# Patient Record
Sex: FEMALE | Race: BLACK OR AFRICAN AMERICAN | Employment: UNEMPLOYED | URBAN - METROPOLITAN AREA
[De-identification: names, ages, dates, MRNs, and addresses within clinical notes are randomized per-mention and may not be internally consistent; named-entity substitution may affect disease eponyms.]

---

## 2017-02-16 ENCOUNTER — OFFICE VISIT (OUTPATIENT)
Dept: FAMILY MEDICINE CLINIC | Age: 52
End: 2017-02-16

## 2017-02-16 VITALS
DIASTOLIC BLOOD PRESSURE: 72 MMHG | HEIGHT: 69 IN | TEMPERATURE: 97.1 F | RESPIRATION RATE: 17 BRPM | BODY MASS INDEX: 30.07 KG/M2 | HEART RATE: 71 BPM | OXYGEN SATURATION: 98 % | WEIGHT: 203 LBS | SYSTOLIC BLOOD PRESSURE: 126 MMHG

## 2017-02-16 DIAGNOSIS — R51.9 HEADACHE, UNSPECIFIED HEADACHE TYPE: Primary | ICD-10-CM

## 2017-02-16 DIAGNOSIS — J30.1 SEASONAL ALLERGIC RHINITIS DUE TO POLLEN: ICD-10-CM

## 2017-02-16 DIAGNOSIS — J01.00 SUBACUTE MAXILLARY SINUSITIS: ICD-10-CM

## 2017-02-16 RX ORDER — AMOXICILLIN 500 MG/1
500 CAPSULE ORAL 3 TIMES DAILY
Qty: 30 CAP | Refills: 0 | Status: SHIPPED | OUTPATIENT
Start: 2017-02-16 | End: 2017-02-16 | Stop reason: ALTCHOICE

## 2017-02-16 RX ORDER — CETIRIZINE HCL 10 MG
10 TABLET ORAL
Qty: 30 TAB | Refills: 3 | Status: SHIPPED | OUTPATIENT
Start: 2017-02-16 | End: 2017-10-17 | Stop reason: SDUPTHER

## 2017-02-16 RX ORDER — AZITHROMYCIN 250 MG/1
TABLET, FILM COATED ORAL
Qty: 6 TAB | Refills: 0 | Status: SHIPPED | OUTPATIENT
Start: 2017-02-16 | End: 2017-03-01 | Stop reason: SDUPTHER

## 2017-02-16 NOTE — PROGRESS NOTES
Patient presents to clinic for headache. Advance Directive:    1. Do you have an advance directive in place? Patient Reply:     2. If not, would you like material regarding how to put one in place? Patient Reply:      Coordination of Care:    1. Have you been to the ER, urgent care clinic since your last visit? Hospitalized since your last visit? Yes. Patient unfamiliar with facility name. Treated for ear infection and vertigo    2. Have you seen or consulted any other health care providers outside of the [address] [place] [address] since your last visit? Include any pap smears or colon screening. Psychiatry, Dr. Katarzyna Barillas     Depression Screening completed. Learning Assessment completed. Abuse Screening completed. Health Maintenance reviewed and discussed per provider.

## 2017-02-16 NOTE — MR AVS SNAPSHOT
Visit Information Date & Time Provider Department Dept. Phone Encounter #  
 2/16/2017  1:00 PM Julio C Johnston, [address] [phone] [id number] Follow-up Instructions Return in about 1 month (around 3/16/2017). Your Appointments 3/29/2017 10:15 AM  
COMPLETE PHYSICAL with Atiya Hidalgo MD  
[facility]) Appt Note: CPE  
 [address] [facility] [address]  
  
   
 [address] [facility] [address] Upcoming Health Maintenance Date Due Hepatitis C Screening 1965 DTaP/Tdap/Td series (1 - Tdap) 8/7/1985 PAP AKA CERVICAL CYTOLOGY 8/7/1985 FOBT Q 1 YEAR AGE 50-75 8/7/2014 BREAST CANCER SCRN MAMMOGRAM 11/12/2017 Allergies as of 2/16/2017  Review Complete On: 2/16/2017 By: Julio C Johnston MD  
 No Known Allergies Current Immunizations  Never Reviewed No immunizations on file. Not reviewed this visit You Were Diagnosed With   
  
 Codes Comments Headache, unspecified headache type    -  Primary ICD-10-CM: R51 ICD-9-CM: 784.0 Seasonal allergic rhinitis due to pollen     ICD-10-CM: J30.1 ICD-9-CM: 477.0 Subacute maxillary sinusitis     ICD-10-CM: J01.00 ICD-9-CM: 461.0 Vitals BP Pulse Temp Resp Height(growth percentile) Weight(growth percentile) 126/72 (BP 1 Location: Right arm, BP Patient Position: Sitting) 71 97.1 °F (36.2 °C) (Oral) 17 5' 9\" (1.753 m) 203 lb (92.1 kg) SpO2 BMI OB Status Smoking Status 98% 29.98 kg/m2 Menopause Never Smoker Vitals History BMI and BSA Data Body Mass Index Body Surface Area  
 29.98 kg/m 2 2.12 m 2 Preferred Pharmacy Pharmacy Name Phone [facility] [address]  [facility] [phone] Your Updated Medication List  
  
   
This list is accurate as of: 2/16/17  1:15 PM.  Always use your most recent med list.  
  
  
  
  
 azithromycin 250 mg tablet Commonly known as:  Deni Meza Take 2 tablets today, then take 1 tablet daily buPROPion  mg XL tablet Commonly known as:  Brigidsilvana Bustamante Take 300 mg by mouth every morning. cetirizine 10 mg tablet Commonly known as:  ZYRTEC Take 1 Tab by mouth nightly. ibuprofen 600 mg tablet Commonly known as:  MOTRIN Take 1 Tab by mouth every six (6) hours as needed for Pain.  
  
 lithium carbonate 300 mg capsule Take  by mouth three (3) times daily. metaxalone 400 mg tablet Commonly known as:  SKELAXIN Take 2 Tabs by mouth three (3) times daily. risperiDONE 1 mg tablet Commonly known as:  RisperDAL Take  by mouth daily. Prescriptions Sent to Pharmacy Refills  
 cetirizine (ZYRTEC) 10 mg tablet 3 Sig: Take 1 Tab by mouth nightly. Class: Normal  
 Pharmacy: [address] Ph #: [phone] Route: Oral  
 azithromycin (ZITHROMAX) 250 mg tablet 0 Sig: Take 2 tablets today, then take 1 tablet daily Class: Normal  
 Pharmacy: [address] Ph #: [phone] Follow-up Instructions Return in about 1 month (around 3/16/2017). To-Do List   
 02/16/2017 Imaging:  CT HEAD W WO CONT Referral Information Referral ID Referred By Referred To  
  
 [id number] Barbara Kaylyn SALAMANCA Not Available Visits Status Start Date End Date 1 New Request 2/16/17 2/16/18 If your referral has a status of pending review or denied, additional information will be sent to support the outcome of this decision. Introducing [facility] & HEALTH SERVICES! [facility] introduces Anzu patient portal. Now you can access parts of your medical record, email your doctor's office, and request medication refills online.    
 
1. In your internet browser, go to [email or web address] 2. Click on the First Time User? Click Here link in the Sign In box. You will see the New Member Sign Up page. 3. Enter your I-Stand Access Code exactly as it appears below. You will not need to use this code after youve completed the sign-up process. If you do not sign up before the expiration date, you must request a new code. · I-Stand Access Code: [email or web address] Expires: 5/17/2017  1:15 PM 
 
4. Enter the last four digits of your Social Security Number (xxxx) and Date of Birth (mm/dd/yyyy) as indicated and click Submit. You will be taken to the next sign-up page. 5. Create a Euclid Systemst ID. This will be your I-Stand login ID and cannot be changed, so think of one that is secure and easy to remember. 6. Create a I-Stand password. You can change your password at any time. 7. Enter your Password Reset Question and Answer. This can be used at a later time if you forget your password. 8. Enter your e-mail address. You will receive e-mail notification when new information is available in [address] 9. Click Sign Up. You can now view and download portions of your medical record. 10. Click the Download Summary menu link to download a portable copy of your medical information. If you have questions, please visit the Frequently Asked Questions section of the I-Stand website. Remember, I-Stand is NOT to be used for urgent needs. For medical emergencies, dial 911. Now available from your iPhone and Android! Please provide this summary of care documentation to your next provider. Your primary care clinician is listed as Mariaelena Fay. If you have any questions after today's visit, please call [phone].

## 2017-02-16 NOTE — PROGRESS NOTES
Loree Blankenship is a 45 y.o.  female and presents with     Chief Complaint   Patient presents with    Head Pain    Allergic Rhinitis    Sinus Infection       Pt says she had ear ache and vertigo in October and went to urgent care. Today she has nasal stuffiness, headaches and sinus congestion. Pt thinks she has sinusitis. Pt says headahces are getting worse. Her uncel had brain tumor. She sweats but she went through menopause 10 years back. Past Medical History   Diagnosis Date    Arthritis     Bipolar 1 disorder ([address]      Past Surgical History   Procedure Laterality Date    Hx tubal ligation      Hx other surgical       knee surgery    Hx  section       x2     Current Outpatient Prescriptions   Medication Sig    amoxicillin (AMOXIL) 500 mg capsule Take 1 Cap by mouth three (3) times daily for 10 days.  cetirizine (ZYRTEC) 10 mg tablet Take 1 Tab by mouth nightly.  risperiDONE (RISPERDAL) 1 mg tablet Take  by mouth daily.  buPROPion XL (WELLBUTRIN XL) 300 mg XL tablet Take 300 mg by mouth every morning.  lithium carbonate 300 mg capsule Take  by mouth three (3) times daily.  ibuprofen (MOTRIN) 600 mg tablet Take 1 Tab by mouth every six (6) hours as needed for Pain.  metaxalone (SKELAXIN) 400 mg tablet Take 2 Tabs by mouth three (3) times daily. No current facility-administered medications for this visit. Health Maintenance   Topic Date Due    Hepatitis C Screening  1965    DTaP/Tdap/Td series (1 - Tdap) 1985    PAP AKA CERVICAL CYTOLOGY  1985    FOBT Q 1 YEAR AGE 50-75  2014    BREAST CANCER SCRN MAMMOGRAM  2017    INFLUENZA AGE 9 TO ADULT  Completed       There is no immunization history on file for this patient. No LMP recorded. Patient is not currently having periods (Reason: Menopause).         Allergies and Intolerances:   No Known Allergies    Family History:   Family History   Problem Relation Age of Onset    Bipolar Disorder Mother     Cancer Father     Diabetes Paternal Grandmother        Social History:   She  reports that she has never smoked. She has never used smokeless tobacco.  She  reports that she does not drink alcohol. Review of Systems: pos for headache  General: negative for - chills, fatigue, fever, weight change  Psych: negative for - anxiety, depression, irritability or mood swings  ENT: negative for - headaches, hearing change, nasal congestion, oral lesions, sneezing or sore throat  Heme/ Lymph: negative for - bleeding problems, bruising, pallor or swollen lymph nodes  Endo: negative for - hot flashes, polydipsia/polyuria or temperature intolerance  Resp: negative for - cough, shortness of breath or wheezing  CV: negative for - chest pain, edema or palpitations  GI: negative for - abdominal pain, change in bowel habits, constipation, diarrhea or nausea/vomiting  : negative for - dysuria, hematuria, incontinence, pelvic pain or vulvar/vaginal symptoms  MSK: negative for - joint pain, joint swelling or muscle pain  Neuro: negative for - confusion, headaches, seizures or weakness  Derm: negative for - dry skin, hair changes, rash or skin lesion changes          Physical:   Vitals:   Vitals:    02/16/17 1248   BP: 126/72   Pulse: 71   Resp: 17   Temp: 97.1 °F (36.2 °C)   TempSrc: Oral   SpO2: 98%   Weight: 203 lb (92.1 kg)   Height: 5' 9\" (1.753 m)           Exam:   HEENT- atraumatic,normocephalic, awake, oriented, well nourished,mild tenderness over left maxillary sinus  Neck - supple,no enlarged lymph nodes, no JVD, no thyromegaly  Chest- CTA, no rhonchi, no crackles  Heart- rrr, no murmurs / gallop/rub  Abdomen- soft,BS+,NT, no hepatosplenomegaly  Ext - no c/c/edema   Neuro- no focal deficits. Power 5/5 all extremities  Skin - warm,dry, no obvious rashes.           Review of Data:   LABS:   Lab Results   Component Value Date/Time    WBC 6.8 07/05/2016 09:00 AM    HGB 13.2 07/05/2016 09:00 AM    HCT 41.4 07/05/2016 09:00 AM    PLATELET 900 88/08/9639 09:00 AM     Lab Results   Component Value Date/Time    Sodium 139 07/05/2016 09:00 AM    Potassium 4.2 07/05/2016 09:00 AM    Chloride 105 07/05/2016 09:00 AM    CO2 26 07/05/2016 09:00 AM    Glucose 91 07/05/2016 09:00 AM    BUN 13 07/05/2016 09:00 AM    Creatinine 1.01 07/05/2016 09:00 AM     Lab Results   Component Value Date/Time    Cholesterol, total 172 07/05/2016 09:00 AM    HDL Cholesterol 51 07/05/2016 09:00 AM    LDL, calculated 76 07/05/2016 09:00 AM    Triglyceride 225 07/05/2016 09:00 AM     No results found for: GPT        Impression / Plan:        ICD-10-CM ICD-9-CM    1. Headache, unspecified headache type R51 784.0 CT HEAD W WO CONT   2. Seasonal allergic rhinitis due to pollen J30.1 477.0 cetirizine (ZYRTEC) 10 mg tablet   3. Subacute maxillary sinusitis J01.00 461.0 amoxicillin (AMOXIL) 500 mg capsule         Explained to patient risk benefits of the medications. Advised patient to stop meds if having any side effects. Pt verbalized understanding of the instructions. I have discussed the diagnosis with the patient and the intended plan as seen in the above orders. The patient has received an after-visit summary and questions were answered concerning future plans. I have discussed medication side effects and warnings with the patient as well. I have reviewed the plan of care with the patient, accepted their input and they are in agreement with the treatment goals. Reviewed plan of care. Patient has provided input and agrees with goals.     Follow-up Disposition: Not on Eunice Burgess MD

## 2017-02-24 ENCOUNTER — TELEPHONE (OUTPATIENT)
Dept: FAMILY MEDICINE CLINIC | Age: 52
End: 2017-02-24

## 2017-02-24 NOTE — TELEPHONE ENCOUNTER
Pt calling in to let Dr. Isaak Del Rio know she talked to her  and they don't feel like they need to gt the MRI done. Thinks it was caused by her being in the bathroom cleaning with the window closed. Please note.

## 2017-03-01 ENCOUNTER — OFFICE VISIT (OUTPATIENT)
Dept: FAMILY MEDICINE CLINIC | Age: 52
End: 2017-03-01

## 2017-03-01 VITALS
HEART RATE: 88 BPM | TEMPERATURE: 97.5 F | WEIGHT: 206 LBS | RESPIRATION RATE: 16 BRPM | DIASTOLIC BLOOD PRESSURE: 67 MMHG | BODY MASS INDEX: 30.51 KG/M2 | HEIGHT: 69 IN | SYSTOLIC BLOOD PRESSURE: 105 MMHG | OXYGEN SATURATION: 97 %

## 2017-03-01 DIAGNOSIS — J01.00 SUBACUTE MAXILLARY SINUSITIS: ICD-10-CM

## 2017-03-01 DIAGNOSIS — J30.1 SEASONAL ALLERGIC RHINITIS DUE TO POLLEN: Primary | ICD-10-CM

## 2017-03-01 DIAGNOSIS — R05.9 COUGH: ICD-10-CM

## 2017-03-01 RX ORDER — FLUTICASONE PROPIONATE 50 MCG
2 SPRAY, SUSPENSION (ML) NASAL DAILY
Qty: 1 BOTTLE | Refills: 3 | Status: SHIPPED | OUTPATIENT
Start: 2017-03-01 | End: 2017-10-17

## 2017-03-01 RX ORDER — AZITHROMYCIN 250 MG/1
TABLET, FILM COATED ORAL
Qty: 6 TAB | Refills: 0 | Status: SHIPPED | OUTPATIENT
Start: 2017-03-01 | End: 2017-03-06

## 2017-03-01 NOTE — PROGRESS NOTES
Watt Litten is a 45 y.o.  female and presents with     Chief Complaint   Patient presents with    Cough    Sinus Infection     Pt says she took the [address] and she felt better with her sinuses. But the symptoms have now come back . She says she has it for a long time so she knows when the sinus problem is coming back. She does not want to do CT scans for her headaches. As she knows it is sinuses. Pt wants to try the same abx again and does not want to try amoxicillin. Pt feels zyrtec helps her some with allergies. Past Medical History:   Diagnosis Date    Arthritis     Bipolar 1 disorder ([facility] [address]      Past Surgical History:   Procedure Laterality Date    HX  SECTION      x2    HX OTHER SURGICAL      knee surgery    HX TUBAL LIGATION       Current Outpatient Prescriptions   Medication Sig    azithromycin (ZITHROMAX) 250 mg tablet Take 2 tablets today, then take 1 tablet daily    fluticasone (FLONASE) 50 mcg/actuation nasal spray 2 Sprays by Both Nostrils route daily.  cetirizine (ZYRTEC) 10 mg tablet Take 1 Tab by mouth nightly.  risperiDONE (RISPERDAL) 1 mg tablet Take  by mouth daily.  buPROPion XL (WELLBUTRIN XL) 300 mg XL tablet Take 300 mg by mouth every morning.  lithium carbonate 300 mg capsule Take  by mouth three (3) times daily.  ibuprofen (MOTRIN) 600 mg tablet Take 1 Tab by mouth every six (6) hours as needed for Pain.  metaxalone (SKELAXIN) 400 mg tablet Take 2 Tabs by mouth three (3) times daily. No current facility-administered medications for this visit. Health Maintenance   Topic Date Due    Hepatitis C Screening  1965    DTaP/Tdap/Td series (1 - Tdap) 1985    PAP AKA CERVICAL CYTOLOGY  1985    FOBT Q 1 YEAR AGE 50-75  2014    BREAST CANCER SCRN MAMMOGRAM  2017    INFLUENZA AGE 9 TO ADULT  Completed       There is no immunization history on file for this patient. No LMP recorded.  Patient is not currently having periods (Reason: Menopause). Allergies and Intolerances:   No Known Allergies    Family History:   Family History   Problem Relation Age of Onset    Bipolar Disorder Mother     Cancer Father     Diabetes Paternal Grandmother        Social History:   She  reports that she has never smoked. She has never used smokeless tobacco.  She  reports that she does not drink alcohol. Review of Systems: pos for allergies, pos for headaches  General: negative for - chills, fatigue, fever, weight change  Psych: negative for - anxiety, depression, irritability or mood swings  ENT: negative for - headaches, hearing change, nasal congestion, oral lesions, sneezing or sore throat  Heme/ Lymph: negative for - bleeding problems, bruising, pallor or swollen lymph nodes  Endo: negative for - hot flashes, polydipsia/polyuria or temperature intolerance  Resp: negative for - cough, shortness of breath or wheezing  CV: negative for - chest pain, edema or palpitations  GI: negative for - abdominal pain, change in bowel habits, constipation, diarrhea or nausea/vomiting  : negative for - dysuria, hematuria, incontinence, pelvic pain or vulvar/vaginal symptoms  MSK: negative for - joint pain, joint swelling or muscle pain  Neuro: negative for - confusion,  seizures or weakness  Derm: negative for - dry skin, hair changes, rash or skin lesion changes          Physical:   Vitals:   Vitals:    03/01/17 1240   BP: 105/67   Pulse: 88   Resp: 16   Temp: 97.5 °F (36.4 °C)   TempSrc: Oral   SpO2: 97%   Weight: 206 lb (93.4 kg)   Height: 5' 9\" (1.753 m)           Exam:   HEENT- atraumatic,normocephalic, awake, oriented, well nourished, enlarged inferior turbinates   Neck - supple,no enlarged lymph nodes, no JVD, no thyromegaly  Chest- CTA, no rhonchi, no crackles  Heart- rrr, no murmurs / gallop/rub  Abdomen- soft,BS+,NT, no hepatosplenomegaly  Ext - no c/c/edema   Neuro- no focal deficits. Power 5/5 all extremities  Skin - warm,dry, no obvious rashes. Review of Data:   LABS:   Lab Results   Component Value Date/Time    WBC 6.8 07/05/2016 09:00 AM    HGB 13.2 07/05/2016 09:00 AM    HCT 41.4 07/05/2016 09:00 AM    PLATELET 448 01/20/0791 09:00 AM     Lab Results   Component Value Date/Time    Sodium 139 07/05/2016 09:00 AM    Potassium 4.2 07/05/2016 09:00 AM    Chloride 105 07/05/2016 09:00 AM    CO2 26 07/05/2016 09:00 AM    Glucose 91 07/05/2016 09:00 AM    BUN 13 07/05/2016 09:00 AM    Creatinine 1.01 07/05/2016 09:00 AM     Lab Results   Component Value Date/Time    Cholesterol, total 172 07/05/2016 09:00 AM    HDL Cholesterol 51 07/05/2016 09:00 AM    LDL, calculated 76 07/05/2016 09:00 AM    Triglyceride 225 07/05/2016 09:00 AM     No results found for: GPT        Impression / Plan:        ICD-10-CM ICD-9-CM    1. Seasonal allergic rhinitis due to pollen J30.1 477.0 fluticasone (FLONASE) 50 mcg/actuation nasal spray      REFERRAL TO ALLERGY   2. Subacute maxillary sinusitis J01.00 461.0 azithromycin (ZITHROMAX) 250 mg tablet      fluticasone (FLONASE) 50 mcg/actuation nasal spray   3. Cough R05 786.2          Explained to patient risk benefits of the medications. Advised patient to stop meds if having any side effects. Pt verbalized understanding of the instructions. I have discussed the diagnosis with the patient and the intended plan as seen in the above orders. The patient has received an after-visit summary and questions were answered concerning future plans. I have discussed medication side effects and warnings with the patient as well. I have reviewed the plan of care with the patient, accepted their input and they are in agreement with the treatment goals. Reviewed plan of care. Patient has provided input and agrees with goals.     Follow-up Disposition: Not on Caryl Nino MD

## 2017-03-01 NOTE — MR AVS SNAPSHOT
Visit Information Date & Time Provider Department Dept. Phone Encounter #  
 3/1/2017 12:45 PM Jose L MATIAS Cody LozanoCharanjit 6 [phone] Follow-up Instructions Return in about 1 month (around 4/1/2017). Your Appointments 3/29/2017 10:15 AM  
COMPLETE PHYSICAL with Chuy Alvarez MD  
[facility]) Appt Note: CPE  
 [address] [facility] [address]  
  
   
 [address] [facility] Upcoming Health Maintenance Date Due Hepatitis C Screening 1965 DTaP/Tdap/Td series (1 - Tdap) 8/7/1985 PAP AKA CERVICAL CYTOLOGY 8/7/1985 FOBT Q 1 YEAR AGE 50-75 8/7/2014 BREAST CANCER SCRN MAMMOGRAM 11/12/2017 Allergies as of 3/1/2017  Review Complete On: 3/1/2017 By: Jose L Lozano MD  
 No Known Allergies Current Immunizations  Never Reviewed No immunizations on file. Not reviewed this visit You Were Diagnosed With   
  
 Codes Comments Seasonal allergic rhinitis due to pollen    -  Primary ICD-10-CM: J30.1 ICD-9-CM: 477.0 Subacute maxillary sinusitis     ICD-10-CM: J01.00 ICD-9-CM: 461.0 Cough     ICD-10-CM: R05 ICD-9-CM: 331. 2 Vitals BP  
  
  
  
  
  
 105/67 (BP 1 Location: Left arm, BP Patient Position: Sitting) Vitals History BMI and BSA Data Body Mass Index Body Surface Area  
 30.42 kg/m 2 2.13 m 2 Preferred Pharmacy Pharmacy Name Phone [facility] [address]  [facility] [phone] Your Updated Medication List  
  
   
This list is accurate as of: 3/1/17  1:00 PM.  Always use your most recent med list.  
  
  
  
  
 azithromycin 250 mg tablet Commonly known as:  Ann-Marie Monroe Take 2 tablets today, then take 1 tablet daily buPROPion  mg XL tablet Commonly known as:  Yanick Saunders  
 Take 300 mg by mouth every morning. cetirizine 10 mg tablet Commonly known as:  ZYRTEC Take 1 Tab by mouth nightly. fluticasone 50 mcg/actuation nasal spray Commonly known as:  Suzanne Shames 2 Sprays by Both Nostrils route daily. ibuprofen 600 mg tablet Commonly known as:  MOTRIN Take 1 Tab by mouth every six (6) hours as needed for Pain.  
  
 lithium carbonate 300 mg capsule Take  by mouth three (3) times daily. metaxalone 400 mg tablet Commonly known as:  SKELAXIN Take 2 Tabs by mouth three (3) times daily. risperiDONE 1 mg tablet Commonly known as:  RisperDAL Take  by mouth daily. Prescriptions Sent to Pharmacy Refills  
 azithromycin (ZITHROMAX) 250 mg tablet 0 Sig: Take 2 tablets today, then take 1 tablet daily Class: Normal  
 Pharmacy: [address] [place] [address] Ph #: [phone]  
 fluticasone (FLONASE) 50 mcg/actuation nasal spray 3 Si Sprays by Both Nostrils route daily. Class: Normal  
 Pharmacy: [facility] [address] [facility] Ph #: [phone] Route: Both Nostrils We Performed the Following REFERRAL TO ALLERGY [REF5 Custom] Comments:  
 Please evaluate patient for allergies Follow-up Instructions Return in about 1 month (around 2017). Referral Information Referral ID Referred By Referred To  
  
 [id number] Armando SALAMANCA Not Available Visits Status Start Date End Date 1 New Request 3/1/17 3/1/18 If your referral has a status of pending review or denied, additional information will be sent to support the outcome of this decision. Introducing [facility] & HEALTH SERVICES! Joelle Gil introduces MAZ patient portal. Now you can access parts of your medical record, email your doctor's office, and request medication refills online.    
 
1. In your internet browser, go to [email or web address] 2. Click on the First Time User? Click Here link in the Sign In box. You will see the New Member Sign Up page. 3. Enter your Briggo Access Code exactly as it appears below. You will not need to use this code after youve completed the sign-up process. If you do not sign up before the expiration date, you must request a new code. · Briggo Access Code: [email or web address] Expires: 5/17/2017  1:15 PM 
 
4. Enter the last four digits of your Social Security Number (xxxx) and Date of Birth (mm/dd/yyyy) as indicated and click Submit. You will be taken to the next sign-up page. 5. Create a Lockboxt ID. This will be your Briggo login ID and cannot be changed, so think of one that is secure and easy to remember. 6. Create a Briggo password. You can change your password at any time. 7. Enter your Password Reset Question and Answer. This can be used at a later time if you forget your password. 8. Enter your e-mail address. You will receive e-mail notification when new information is available in [address] 9. Click Sign Up. You can now view and download portions of your medical record. 10. Click the Download Summary menu link to download a portable copy of your medical information. If you have questions, please visit the Frequently Asked Questions section of the Briggo website. Remember, Briggo is NOT to be used for urgent needs. For medical emergencies, dial 911. Now available from your iPhone and Android! Please provide this summary of care documentation to your next provider. Your primary care clinician is listed as Elana Barrett. If you have any questions after today's visit, please call [phone].

## 2017-03-01 NOTE — PROGRESS NOTES
1. Have you been to the ER, urgent care clinic since your last visit? Hospitalized since your last visit? No    2. Have you seen or consulted any other health care providers outside of the [address] [place] [address] since your last visit? Include any pap smears or colon screening.  No

## 2017-03-29 ENCOUNTER — OFFICE VISIT (OUTPATIENT)
Dept: FAMILY MEDICINE CLINIC | Age: 52
End: 2017-03-29

## 2017-03-29 ENCOUNTER — HOSPITAL ENCOUNTER (OUTPATIENT)
Dept: LAB | Age: 52
Discharge: HOME | End: 2017-03-29
Payer: MEDICARE

## 2017-03-29 VITALS
WEIGHT: 201.4 LBS | OXYGEN SATURATION: 97 % | RESPIRATION RATE: 18 BRPM | TEMPERATURE: 96.9 F | DIASTOLIC BLOOD PRESSURE: 74 MMHG | SYSTOLIC BLOOD PRESSURE: 109 MMHG | HEART RATE: 80 BPM | HEIGHT: 69 IN | BODY MASS INDEX: 29.83 KG/M2

## 2017-03-29 DIAGNOSIS — Z13.39 SCREENING FOR ALCOHOLISM: ICD-10-CM

## 2017-03-29 DIAGNOSIS — Z00.00 ROUTINE GENERAL MEDICAL EXAMINATION AT A HEALTH CARE FACILITY: Primary | ICD-10-CM

## 2017-03-29 DIAGNOSIS — Z23 ENCOUNTER FOR IMMUNIZATION: ICD-10-CM

## 2017-03-29 DIAGNOSIS — Z11.59 NEED FOR HEPATITIS C SCREENING TEST: ICD-10-CM

## 2017-03-29 DIAGNOSIS — Z00.00 ENCOUNTER FOR ANNUAL PHYSICAL EXAM: ICD-10-CM

## 2017-03-29 DIAGNOSIS — Z12.31 ENCOUNTER FOR SCREENING MAMMOGRAM FOR MALIGNANT NEOPLASM OF BREAST: ICD-10-CM

## 2017-03-29 LAB
ALBUMIN SERPL BCP-MCNC: 4.2 G/DL (ref 3.4–5)
ALBUMIN/GLOB SERPL: 1.2 {RATIO} (ref 0.8–1.7)
ALP SERPL-CCNC: 105 U/L (ref 45–117)
ALT SERPL-CCNC: 23 U/L (ref 13–56)
ANION GAP BLD CALC-SCNC: 3 MMOL/L (ref 3–18)
AST SERPL W P-5'-P-CCNC: 17 U/L (ref 15–37)
BILIRUB SERPL-MCNC: 0.3 MG/DL (ref 0.2–1)
BUN SERPL-MCNC: 12 MG/DL (ref 7–18)
BUN/CREAT SERPL: 11 (ref 12–20)
CALCIUM SERPL-MCNC: 9.2 MG/DL (ref 8.5–10.1)
CHLORIDE SERPL-SCNC: 104 MMOL/L (ref 100–108)
CHOLEST SERPL-MCNC: 158 MG/DL
CO2 SERPL-SCNC: 30 MMOL/L (ref 21–32)
CREAT SERPL-MCNC: 1.05 MG/DL (ref 0.6–1.3)
GLOBULIN SER CALC-MCNC: 3.4 G/DL (ref 2–4)
GLUCOSE SERPL-MCNC: 101 MG/DL (ref 74–99)
HDLC SERPL-MCNC: 52 MG/DL (ref 40–60)
HDLC SERPL: 3 {RATIO} (ref 0–5)
LDLC SERPL CALC-MCNC: 78.4 MG/DL (ref 0–100)
LIPID PROFILE,FLP: NORMAL
POTASSIUM SERPL-SCNC: 4 MMOL/L (ref 3.5–5.5)
PROT SERPL-MCNC: 7.6 G/DL (ref 6.4–8.2)
SODIUM SERPL-SCNC: 137 MMOL/L (ref 136–145)
TRIGL SERPL-MCNC: 138 MG/DL (ref ?–150)
VLDLC SERPL CALC-MCNC: 27.6 MG/DL

## 2017-03-29 PROCEDURE — 80053 COMPREHEN METABOLIC PANEL: CPT | Performed by: INTERNAL MEDICINE

## 2017-03-29 PROCEDURE — 80061 LIPID PANEL: CPT | Performed by: INTERNAL MEDICINE

## 2017-03-29 PROCEDURE — 36415 COLL VENOUS BLD VENIPUNCTURE: CPT | Performed by: INTERNAL MEDICINE

## 2017-03-29 PROCEDURE — 86803 HEPATITIS C AB TEST: CPT | Performed by: INTERNAL MEDICINE

## 2017-03-29 RX ORDER — LITHIUM CARBONATE 300 MG
TABLET ORAL
Refills: 0 | COMMUNITY
Start: 2017-03-07

## 2017-03-29 NOTE — PROGRESS NOTES
This is an Initial Medicare Annual Wellness Exam (AWV) (Performed 12 months after IPPE or effective date of Medicare Part B enrollment, Once in a lifetime)    I have reviewed the patient's medical history in detail and updated the computerized patient record. History     Past Medical History:   Diagnosis Date    Arthritis     Bipolar 1 disorder ([address]     Psychotic disorder     Bipolar disorder      Past Surgical History:   Procedure Laterality Date    HX  SECTION      x2    HX OTHER SURGICAL      knee surgery    HX TUBAL LIGATION       Current Outpatient Prescriptions   Medication Sig Dispense Refill    fluticasone (FLONASE) 50 mcg/actuation nasal spray 2 Sprays by Both Nostrils route daily. 1 Bottle 3    cetirizine (ZYRTEC) 10 mg tablet Take 1 Tab by mouth nightly. 30 Tab 3    risperiDONE (RISPERDAL) 1 mg tablet Take  by mouth daily.  buPROPion XL (WELLBUTRIN XL) 300 mg XL tablet Take 300 mg by mouth every morning.  ibuprofen (MOTRIN) 600 mg tablet Take 1 Tab by mouth every six (6) hours as needed for Pain. 100 Tab 2    lithium carbonate 300 mg tablet take 1 tablet by mouth every morning and 2 tablets at bedtime  0     No Known Allergies  Family History   Problem Relation Age of Onset    Bipolar Disorder Mother     Cancer Father     Diabetes Paternal Grandmother      Social History   Substance Use Topics    Smoking status: Never Smoker    Smokeless tobacco: Never Used    Alcohol use No     There is no problem list on file for this patient. Depression Risk Factor Screening:   No flowsheet data found. Alcohol Risk Factor Screening: On any occasion during the past 3 months, have you had more than 3 drinks containing alcohol? No    Do you average more than 7 drinks per week? No    Functional Ability and Level of Safety:     Hearing Loss   none    Activities of Daily Living   Self-care. Requires assistance with: no ADLs    Fall Risk   No flowsheet data found.   Abuse Screen   Patient is not abused    Review of Systems   Constitutional: negative  Ears, nose, mouth, throat, and face: negative  Respiratory: negative  Cardiovascular: negative  Gastrointestinal: negative  Genitourinary:negative  Integument/breast: negative  Musculoskeletal:negative  Neurological: negative  Behavioral/Psych: positive for anxiety    Physical Examination     Visit Vitals    /74 (BP 1 Location: Left arm, BP Patient Position: Sitting)    Pulse 80    Temp 96.9 °F (36.1 °C) (Oral)    Resp 18    Ht 5' 9\" (1.753 m)    Wt 201 lb 6.4 oz (91.4 kg)    SpO2 97%    BMI 29.74 kg/m2       Evaluation of Cognitive Function:  Mood/affect:  neutral  Appearance: age appropriate  Family member/caregiver input: none    General appearance: alert, cooperative, no distress, appears stated age  Eyes: negative  Ears: normal TM's and external ear canals AU  Nose: Nares normal. Septum midline. Mucosa normal. No drainage or sinus tenderness. Throat: Lips, mucosa, and tongue normal. Teeth and gums normal  Neck: supple, symmetrical, trachea midline, no adenopathy, thyroid: not enlarged, symmetric, no tenderness/mass/nodules, no carotid bruit and no JVD  Lungs: clear to auscultation bilaterally  Heart: regular rate and rhythm, S1, S2 normal, no murmur, click, rub or gallop  Abdomen: soft, non-tender. Bowel sounds normal. No masses,  no organomegaly  Extremities: extremities normal, atraumatic, no cyanosis or edema  Pulses: 2+ and symmetric  Skin: Skin color, texture, turgor normal. No rashes or lesions    Patient Care Team:  Elisabet Early MD as PCP - General (Internal Medicine)  Kaycee Asencio MD (Unknown Physician Specialty)    Advice/Referrals/Counseling   Education and counseling provided:  End-of-Life planning (with patient's consent)  Cardiovascular screening blood test  Diabetes screening test      Assessment/Plan       ICD-10-CM ICD-9-CM    1.  Routine general medical examination at a health care facility Z00.00 V70.0 AMB POC EKG ROUTINE W/ 12 LEADS, SCREEN ()      LIPID PANEL      METABOLIC PANEL, COMPREHENSIVE   2. Encounter for annual physical exam Z00.00 V70.0 AMB POC EKG ROUTINE W/ 12 LEADS, INTER & REP   3. Screening for alcoholism Z13.89 V79.1    4. Encounter for immunization Z23 V03.89 varicella zoster vacine live (ZOSTAVAX) 19,400 unit/0.65 mL susr injection      TETANUS, DIPHTHERIA TOXOIDS AND ACELLULAR PERTUSSIS VACCINE (TDAP), IN INDIVIDS. >=7, IM   5. Need for hepatitis C screening test Z11.59 V73.89 HEPATITIS C AB   6. Encounter for screening mammogram for malignant neoplasm of breast Z12.31 V76.12 [facility] BI SCREENING INCL CAD   .

## 2017-03-29 NOTE — PROGRESS NOTES
Patient presents to clinic for complete physical.     Advance Directive:    1. Do you have an advance directive in place? Patient Reply:     2. If not, would you like material regarding how to put one in place? Patient Reply:      Coordination of Care:    1. Have you been to the ER, urgent care clinic since your last visit? Hospitalized since your last visit? No    2. Have you seen or consulted any other health care providers outside of the [facility] since your last visit? Include any pap smears or colon screening. No    Depression Screening completed. Learning Assessment completed. Abuse Screening completed. Health Maintenance reviewed and discussed per provider. Pt received tdap vaccine 0.5ml in right deltoid. Tolerated well. No signs or symptoms of distress noted. Most current VIS given and consent signed.

## 2017-03-29 NOTE — MR AVS SNAPSHOT
Visit Information Date & Time Provider Department Dept. Phone Encounter #  
 3/29/2017 10:15 AM Shaila Hernandez, [address] [facility] [phone] [id number] Follow-up Instructions Return in about 1 year (around 3/29/2018), or if symptoms worsen or fail to improve, for annual physical.  
  
Upcoming Health Maintenance Date Due Hepatitis C Screening 1965 DTaP/Tdap/Td series (1 - Tdap) 8/7/1985 PAP AKA CERVICAL CYTOLOGY 8/7/1985 FOBT Q 1 YEAR AGE 50-75 8/7/2014 BREAST CANCER SCRN MAMMOGRAM 11/12/2017 Allergies as of 3/29/2017  Review Complete On: 3/29/2017 By: Shaila Hernandez MD  
 No Known Allergies Current Immunizations  Never Reviewed Name Date Tdap  Incomplete Not reviewed this visit You Were Diagnosed With   
  
 Codes Comments Routine general medical examination at a health care facility    -  Primary ICD-10-CM: Z00.00 ICD-9-CM: V70.0 Encounter for annual physical exam     ICD-10-CM: Z00.00 ICD-9-CM: V70.0 Screening for alcoholism     ICD-10-CM: Z13.89 ICD-9-CM: V79.1 Encounter for immunization     ICD-10-CM: D85 ICD-9-CM: V03.89 Need for hepatitis C screening test     ICD-10-CM: Z11.59 
ICD-9-CM: V73.89 Encounter for screening mammogram for malignant neoplasm of breast     ICD-10-CM: Z12.31 
ICD-9-CM: V76.12 Vitals BP Pulse Temp Resp Height(growth percentile) Weight(growth percentile) 109/74 (BP 1 Location: Left arm, BP Patient Position: Sitting) 80 96.9 °F (36.1 °C) (Oral) 18 5' 9\" (1.753 m) 201 lb 6.4 oz (91.4 kg) SpO2 BMI OB Status Smoking Status 97% 29.74 kg/m2 Menopause Never Smoker Vitals History BMI and BSA Data Body Mass Index Body Surface Area  
 29.74 kg/m 2 2.11 m 2 Preferred Pharmacy Pharmacy Name Phone [facility] [address] [address]  [facility] [phone] Your Updated Medication List  
  
   
 This list is accurate as of: 3/29/17 10:50 AM.  Always use your most recent med list.  
  
  
  
  
 buPROPion  mg XL tablet Commonly known as:  Arnel Crew Take 300 mg by mouth every morning. cetirizine 10 mg tablet Commonly known as:  ZYRTEC Take 1 Tab by mouth nightly. fluticasone 50 mcg/actuation nasal spray Commonly known as:  Lella Solum 2 Sprays by Both Nostrils route daily. ibuprofen 600 mg tablet Commonly known as:  MOTRIN Take 1 Tab by mouth every six (6) hours as needed for Pain.  
  
 lithium carbonate 300 mg tablet  
take 1 tablet by mouth every morning and 2 tablets at bedtime  
  
 risperiDONE 1 mg tablet Commonly known as:  RisperDAL Take  by mouth daily. varicella zoster vacine live 19,400 unit/0.65 mL Susr injection Commonly known as:  ZOSTAVAX  
1 Vial by SubCUTAneous route once for 1 dose. Prescriptions Printed Refills  
 varicella zoster vacine live (ZOSTAVAX) 19,400 unit/0.65 mL susr injection 0 Si Vial by SubCUTAneous route once for 1 dose. Class: Print Route: SubCUTAneous We Performed the Following AMB POC EKG ROUTINE W/ 12 LEADS, INTER & REP [05100 CPT(R)] AMB POC EKG ROUTINE W/ 12 LEADS, SCREEN () [ [facility] HEPATITIS C AB [44725 CPT(R)] TETANUS, DIPHTHERIA TOXOIDS AND ACELLULAR PERTUSSIS VACCINE (TDAP), IN INDIVIDS. >=7, IM [id number] CPT(R)] Follow-up Instructions Return in about 1 year (around 3/29/2018), or if symptoms worsen or fail to improve, for annual physical.  
  
To-Do List   
 2017 Lab:  LIPID PANEL   
  
 2017 Lab:  METABOLIC PANEL, COMPREHENSIVE   
  
 2017 Imaging:  [facility]MO BI SCREENING INCL CAD Introducing [facility]! [facility] introduces [employer] patient portal. Now you can access parts of your medical record, email your doctor's office, and request medication refills online. 1. In your internet browser, go to [email or web address] [employer] 2. Click on the First Time User? Click Here link in the Sign In box. You will see the New Member Sign Up page. 3. Enter your Peak Access Code exactly as it appears below. You will not need to use this code after youve completed the sign-up process. If you do not sign up before the expiration date, you must request a new code. · Peak Access Code: [email or web address] Expires: 5/17/2017  2:15 PM 
 
4. Enter the last four digits of your Social Security Number (xxxx) and Date of Birth (mm/dd/yyyy) as indicated and click Submit. You will be taken to the next sign-up page. 5. Create a WhatsNexxt ID. This will be your Peak login ID and cannot be changed, so think of one that is secure and easy to remember. 6. Create a Peak password. You can change your password at any time. 7. Enter your Password Reset Question and Answer. This can be used at a later time if you forget your password. 8. Enter your e-mail address. You will receive e-mail notification when new information is available in [address] 9. Click Sign Up. You can now view and download portions of your medical record. 10. Click the Download Summary menu link to download a portable copy of your medical information. If you have questions, please visit the Frequently Asked Questions section of the Peak website. Remember, Peak is NOT to be used for urgent needs. For medical emergencies, dial 911. Now available from your iPhone and Android! Please provide this summary of care documentation to your next provider. Your primary care clinician is listed as Lajuan Habermann. If you have any questions after today's visit, please call [phone].

## 2017-03-30 LAB
HCV AB SER IA-ACNC: 0.11 INDEX
HCV AB SERPL QL IA: NEGATIVE
HCV COMMENT,HCGAC: NORMAL

## 2017-10-17 ENCOUNTER — OFFICE VISIT (OUTPATIENT)
Dept: FAMILY MEDICINE CLINIC | Age: 52
End: 2017-10-17

## 2017-10-17 VITALS
HEART RATE: 69 BPM | WEIGHT: 208.2 LBS | TEMPERATURE: 97.8 F | BODY MASS INDEX: 30.84 KG/M2 | DIASTOLIC BLOOD PRESSURE: 76 MMHG | SYSTOLIC BLOOD PRESSURE: 109 MMHG | HEIGHT: 69 IN | OXYGEN SATURATION: 96 % | RESPIRATION RATE: 20 BRPM

## 2017-10-17 DIAGNOSIS — Z12.39 SCREENING FOR MALIGNANT NEOPLASM OF BREAST: ICD-10-CM

## 2017-10-17 DIAGNOSIS — M25.561 CHRONIC PAIN OF RIGHT KNEE: ICD-10-CM

## 2017-10-17 DIAGNOSIS — J30.1 CHRONIC SEASONAL ALLERGIC RHINITIS DUE TO POLLEN: ICD-10-CM

## 2017-10-17 DIAGNOSIS — Z23 ENCOUNTER FOR IMMUNIZATION: ICD-10-CM

## 2017-10-17 DIAGNOSIS — G89.29 CHRONIC PAIN OF RIGHT KNEE: ICD-10-CM

## 2017-10-17 DIAGNOSIS — F31.63 BIPOLAR DISORDER, CURRENT EPISODE MIXED, SEVERE, WITHOUT PSYCHOTIC FEATURES (HCC): Primary | ICD-10-CM

## 2017-10-17 RX ORDER — MOMETASONE FUROATE 50 UG/1
2 SPRAY, METERED NASAL DAILY
Qty: 1 CONTAINER | Refills: 5 | Status: SHIPPED | OUTPATIENT
Start: 2017-10-17 | End: 2018-06-06

## 2017-10-17 RX ORDER — IBUPROFEN 600 MG/1
600 TABLET ORAL
Qty: 100 TAB | Refills: 2 | Status: SHIPPED | OUTPATIENT
Start: 2017-10-17 | End: 2018-06-06 | Stop reason: SDUPTHER

## 2017-10-17 RX ORDER — CETIRIZINE HCL 10 MG
10 TABLET ORAL
Qty: 100 TAB | Refills: 2 | Status: SHIPPED | OUTPATIENT
Start: 2017-10-17 | End: 2018-06-06 | Stop reason: SDUPTHER

## 2017-10-17 NOTE — PATIENT INSTRUCTIONS
Knee Arthritis: Exercises  Your Care Instructions  Here are some examples of exercises for knee arthritis. Start each exercise slowly. Ease off the exercise if you start to have pain. Your doctor or physical therapist will tell you when you can start these exercises and which ones will work best for you. How to do the exercises  Knee flexion with heel slide    1. Lie on your back with your knees bent. 2. Slide your heel back by bending your affected knee as far as you can. Then hook your other foot around your ankle to help pull your heel even farther back. 3. Hold for about 6 seconds, then rest for up to 10 seconds. 4. Repeat 8 to 12 times. 5. Switch legs and repeat steps 1 through 4, even if only one knee is sore. Quad sets    1. Sit with your affected leg straight and supported on the floor or a firm bed. Place a small, rolled-up towel under your knee. Your other leg should be bent, with that foot flat on the floor. 2. Tighten the thigh muscles of your affected leg by pressing the back of your knee down into the towel. 3. Hold for about 6 seconds, then rest for up to 10 seconds. 4. Repeat 8 to 12 times. 5. Switch legs and repeat steps 1 through 4, even if only one knee is sore. Straight-leg raises to the front    1. Lie on your back with your good knee bent so that your foot rests flat on the floor. Your affected leg should be straight. Make sure that your low back has a normal curve. You should be able to slip your hand in between the floor and the small of your back, with your palm touching the floor and your back touching the back of your hand. 2. Tighten the thigh muscles in your affected leg by pressing the back of your knee flat down to the floor. Hold your knee straight. 3. Keeping the thigh muscles tight and your leg straight, lift your affected leg up so that your heel is about 12 inches off the floor. Hold for about 6 seconds, then lower slowly.   4. Relax for up to 10 seconds between repetitions. 5. Repeat 8 to 12 times. 6. Switch legs and repeat steps 1 through 5, even if only one knee is sore. Active knee flexion    1. Lie on your stomach with your knees straight. If your kneecap is uncomfortable, roll up a washcloth and put it under your leg just above your kneecap. 2. Lift the foot of your affected leg by bending the knee so that you bring the foot up toward your buttock. If this motion hurts, try it without bending your knee quite as far. This may help you avoid any painful motion. 3. Slowly move your leg up and down. 4. Repeat 8 to 12 times. 5. Switch legs and repeat steps 1 through 4, even if only one knee is sore. Quadriceps stretch (facedown)    1. Lie flat on your stomach, and rest your face on the floor. 2. Wrap a towel or belt strap around the lower part of your affected leg. Then use the towel or belt strap to slowly pull your heel toward your buttock until you feel a stretch. 3. Hold for about 15 to 30 seconds, then relax your leg against the towel or belt strap. 4. Repeat 2 to 4 times. 5. Switch legs and repeat steps 1 through 4, even if only one knee is sore. Stationary exercise bike    If you do not have a stationary exercise bike at home, you can find one to ride at your local health club or community center. 1. Adjust the height of the bike seat so that your knee is slightly bent when your leg is extended downward. If your knee hurts when the pedal reaches the top, you can raise the seat so that your knee does not bend as much. 2. Start slowly. At first, try to do 5 to 10 minutes of cycling with little to no resistance. Then increase your time and the resistance bit by bit until you can do 20 to 30 minutes without pain. 3. If you start to have pain, rest your knee until your pain gets back to the level that is normal for you. Or cycle for less time or with less effort. Follow-up care is a key part of your treatment and safety.  Be sure to make and go to all appointments, and call your doctor if you are having problems. It's also a good idea to know your test results and keep a list of the medicines you take. Where can you learn more? Go to http://gisell-lori.info/. Enter C159 in the search box to learn more about \"Knee Arthritis: Exercises. \"  Current as of: March 21, 2017  Content Version: 11.3  © 3079-0815 [employer]. Care instructions adapted under license by [employer] (which disclaims liability or warranty for this information). If you have questions about a medical condition or this instruction, always ask your healthcare professional. [place] [address] any warranty or liability for your use of this information. Vaccine Information Statement    Influenza (Flu) Vaccine (Inactivated or Recombinant): What you need to know    Many Vaccine Information Statements are available in Mongolian and other languages. See www.immunize.org/vis  Hojas de Información Sobre Vacunas están disponibles en Español y en muchos otros idiomas. Visite www.immunize.org/vis    1. Why get vaccinated? Influenza (flu) is a contagious disease that spreads around the [place] every year, usually between October and May. Flu is caused by influenza viruses, and is spread mainly by coughing, sneezing, and close contact. Anyone can get flu. Flu strikes suddenly and can last several days. Symptoms vary by age, but can include:   fever/chills   sore throat   muscle aches   fatigue   cough   headache    runny or stuffy nose    Flu can also lead to pneumonia and blood infections, and cause diarrhea and seizures in children. If you have a medical condition, such as heart or lung disease, flu can make it worse. Flu is more dangerous for some people.  Infants and young children, people 72years of age and older, pregnant women, and people with certain health conditions or a weakened immune system are at greatest risk.      Each year thousands of people in the [facility] die from flu, and many more are hospitalized. Flu vaccine can:   keep you from getting flu,   make flu less severe if you do get it, and   keep you from spreading flu to your family and other people. 2. Inactivated and recombinant flu vaccines    A dose of flu vaccine is recommended every flu season. Children 6 months through 6years of age may need two doses during the same flu season. Everyone else needs only one dose each flu season. Some inactivated flu vaccines contain a very small amount of a mercury-based preservative called thimerosal. Studies have not shown thimerosal in vaccines to be harmful, but flu vaccines that do not contain thimerosal are available. There is no live flu virus in flu shots. They cannot cause the flu. There are many flu viruses, and they are always changing. Each year a new flu vaccine is made to protect against three or four viruses that are likely to cause disease in the upcoming flu season. But even when the vaccine doesnt exactly match these viruses, it may still provide some protection    Flu vaccine cannot prevent:   flu that is caused by a virus not covered by the vaccine, or   illnesses that look like flu but are not. It takes about 2 weeks for protection to develop after vaccination, and protection lasts through the flu season. 3. Some people should not get this vaccine    Tell the person who is giving you the vaccine:     If you have any severe, life-threatening allergies. If you ever had a life-threatening allergic reaction after a dose of flu vaccine, or have a severe allergy to any part of this vaccine, you may be advised not to get vaccinated. Most, but not all, types of flu vaccine contain a small amount of egg protein.  If you ever had Guillain-Barré Syndrome (also called GBS). Some people with a history of GBS should not get this vaccine.  This should be discussed with your doctor.  If you are not feeling well. It is usually okay to get flu vaccine when you have a mild illness, but you might be asked to come back when you feel better. 4. Risks of a vaccine reaction    With any medicine, including vaccines, there is a chance of reactions. These are usually mild and go away on their own, but serious reactions are also possible. Most people who get a flu shot do not have any problems with it. Minor problems following a flu shot include:    soreness, redness, or swelling where the shot was given     hoarseness   sore, red or itchy eyes   cough   fever   aches   headache   itching   fatigue  If these problems occur, they usually begin soon after the shot and last 1 or 2 days. More serious problems following a flu shot can include the following:     There may be a small increased risk of Guillain-Barré Syndrome (GBS) after inactivated flu vaccine. This risk has been estimated at 1 or 2 additional cases per million people vaccinated. This is much lower than the risk of severe complications from flu, which can be prevented by flu vaccine.  Young children who get the flu shot along with pneumococcal vaccine (PCV13) and/or DTaP vaccine at the same time might be slightly more likely to have a seizure caused by fever. Ask your doctor for more information. Tell your doctor if a child who is getting flu vaccine has ever had a seizure. Problems that could happen after any injected vaccine:      People sometimes faint after a medical procedure, including vaccination. Sitting or lying down for about 15 minutes can help prevent fainting, and injuries caused by a fall. Tell your doctor if you feel dizzy, or have vision changes or ringing in the ears.  Some people get severe pain in the shoulder and have difficulty moving the arm where a shot was given. This happens very rarely.  Any medication can cause a severe allergic reaction. Such reactions from a vaccine are very rare, estimated at about 1 in a million doses, and would happen within a few minutes to a few hours after the vaccination. As with any medicine, there is a very remote chance of a vaccine causing a serious injury or death. The safety of vaccines is always being monitored. For more information, visit: www.cdc.gov/vaccinesafety/    5. What if there is a serious reaction? What should I look for?  Look for anything that concerns you, such as signs of a severe allergic reaction, very high fever, or unusual behavior. Signs of a severe allergic reaction can include hives, swelling of the face and throat, difficulty breathing, a fast heartbeat, dizziness, and weakness  usually within a few minutes to a few hours after the vaccination. What should I do?  If you think it is a severe allergic reaction or other emergency that cant wait, call 9-1-1 and get the person to the nearest hospital. Otherwise, call your doctor.  Reactions should be reported to the Vaccine Adverse Event Reporting System (VAERS). Your doctor should file this report, or you can do it yourself through  the VAERS web site at www.vaers. [facility].gov, or by calling 8-622.972.2615. VAERS does not give medical advice. 6. The National Vaccine Injury Compensation Program    The [facility] Vaccine Injury Compensation Program (VICP) is a federal program that was created to compensate people who may have been injured by certain vaccines. Persons who believe they may have been injured by a vaccine can learn about the program and about filing a claim by calling 0-934.870.3408 or visiting the [employer] website at www.[facility].gov/vaccinecompensation. There is a time limit to file a claim for compensation. 7. How can I learn more?  Ask your healthcare provider. He or she can give you the vaccine package insert or suggest other sources of information.    Call your local or [facility] department.  Contact the Centers for Disease Control and Prevention (CDC):  - Call 3-168.717.7230 (1-800-CDC-INFO) or  - Visit CDCs website at www.cdc.gov/flu    Vaccine Information Statement   Inactivated Influenza Vaccine   8/7/2015  42 GUSTAVO Tai [id number]    Department of Health and Human Services  Centers for Disease Control and Prevention    Office Use Only

## 2017-10-17 NOTE — PROGRESS NOTES
History of Present Illness  Darnell Winters Monik Galo is a 47 y.o. female who presents today for management of    Chief Complaint   Patient presents with    Medication Refill    Knee Pain    Hip Pain       Patient complains of bilateral knee pain for many years. She has chronic on and off pain, but it has been constant in the last 2 weeks. Pain intensity 10/10, achy in character, worse when walking. Yesterday, she started having a right hip pain everytime she walks. which is sharp. She takes ibuprofen 600mg with relief. She complains of a postnasal drip. She sees Dr. Pasquale Hsu for bipolar disorder. Problem List  Patient Active Problem List    Diagnosis Date Noted    Chronic pain of right knee 10/17/2017    Chronic seasonal allergic rhinitis due to pollen 10/17/2017       Past Medical History  Past Medical History:   Diagnosis Date    Arthritis     Bipolar 1 disorder ([address]     Psychotic disorder     Bipolar disorder        Surgical History  Past Surgical History:   Procedure Laterality Date    HX  SECTION      x2    HX OTHER SURGICAL      knee surgery    HX TUBAL LIGATION          Current Medications  Current Outpatient Prescriptions   Medication Sig    cetirizine (ZYRTEC) 10 mg tablet Take 1 Tab by mouth nightly.  ibuprofen (MOTRIN) 600 mg tablet Take 1 Tab by mouth every six (6) hours as needed for Pain.  mometasone (NASONEX) 50 mcg/actuation nasal spray 2 Sprays by Both Nostrils route daily.  lithium carbonate 300 mg tablet take 1 tablet by mouth every morning and 2 tablets at bedtime    risperiDONE (RISPERDAL) 1 mg tablet Take  by mouth daily.  buPROPion XL (WELLBUTRIN XL) 300 mg XL tablet Take 300 mg by mouth every morning. No current facility-administered medications for this visit.         Allergies/Drug Reactions  No Known Allergies     Family History  Family History   Problem Relation Age of Onset    Bipolar Disorder Mother     Cancer Father     Diabetes Paternal Grandmother Social History  Social History     Social History    Marital status:      Spouse name: N/A    Number of children: N/A    Years of education: N/A     Occupational History    Not on file. Social History Main Topics    Smoking status: Never Smoker    Smokeless tobacco: Never Used    Alcohol use No    Drug use: No    Sexual activity: Yes     Partners: Male     Other Topics Concern    Not on file     Social History Narrative       Review of Systems  General ROS: negative for - chills, fatigue or fever  Genito-Urinary ROS: no dysuria, trouble voiding, or hematuria  Musculoskeletal ROS: positive for - pain in knee - bilateral      Physical Exam  Vital signs:   Vitals:    10/17/17 1435   BP: 109/76   Pulse: 69   Resp: 20   Temp: 97.8 °F (36.6 °C)   TempSrc: Oral   SpO2: 96%   Weight: 208 lb 3.2 oz (94.4 kg)   Height: 5' 9\" (1.753 m)       General: alert, oriented, not in distress  Chest/Lungs: clear breath sounds, no wheezing or crackles  Heart: normal rate, regular rhythm, no murmur  Abdomen: soft, non-distended, non-tender, normal bowel sounds, no organomegaly, no masses  Extremities: no focal deformities, no edema      Assessment/Plan:      1. Chronic seasonal allergic rhinitis due to pollen  - stable  - cetirizine (ZYRTEC) 10 mg tablet; Take 1 Tab by mouth nightly. Dispense: 100 Tab; Refill: 2  - start mometasone (NASONEX) 50 mcg/actuation nasal spray; 2 Sprays by Both Nostrils route daily. Dispense: 1 Container; Refill: 5    2. Chronic pain of right knee  - home exercises  - ibuprofen (MOTRIN) 600 mg tablet; Take 1 Tab by mouth every six (6) hours as needed for Pain. Dispense: 100 Tab; Refill: 2    3. Bipolar disorder, current episode mixed, severe, without psychotic features ([facility] [address]  - stable  - psych follow-up    Had colonoscopy less than 5 years ago at [facility]    Follow-up Disposition:  Return in about 6 months (around 4/17/2018) for CPE.       I have discussed the diagnosis with the patient and the intended plan as seen in the above orders. The patient has received an after-visit summary and questions were answered concerning future plans. I have discussed medication side effects and warnings with the patient as well. I have reviewed the plan of care with the patient, accepted their input and they are in agreement with the treatment goals.        Atiya Hidalgo MD  October 17, 2017

## 2017-10-17 NOTE — MR AVS SNAPSHOT
Visit Information Date & Time Provider Department Dept. Phone Encounter #  
 10/17/2017  2:45 PM Rosemarie Riddle, [address] [phone] [id number] Follow-up Instructions Return in about 6 months (around 4/17/2018) for CPE. Upcoming Health Maintenance Date Due FOBT Q 1 YEAR AGE 50-75 8/7/2014 PAP AKA CERVICAL CYTOLOGY 3/28/2015 INFLUENZA AGE 9 TO ADULT 8/1/2017 BREAST CANCER SCRN MAMMOGRAM 11/12/2017 DTaP/Tdap/Td series (2 - Td) 3/29/2027 Allergies as of 10/17/2017  Review Complete On: 10/17/2017 By: Rosemarie Riddle MD  
 No Known Allergies Current Immunizations  Never Reviewed Name Date Influenza Vaccine (Quad) PF  Incomplete Tdap 3/29/2017 Not reviewed this visit You Were Diagnosed With   
  
 Codes Comments Bipolar disorder, current episode mixed, severe, without psychotic features [facility]    -  Primary ICD-10-CM: F31.63 ICD-9-CM: 296.63 Chronic seasonal allergic rhinitis due to pollen     ICD-10-CM: J30.1 ICD-9-CM: 477.0 Chronic pain of right knee     ICD-10-CM: M25.561, G89.29 ICD-9-CM: 719.46, 338.29 Encounter for immunization     ICD-10-CM: X70 ICD-9-CM: V03.89 Screening for malignant neoplasm of breast     ICD-10-CM: Z12.31 
ICD-9-CM: V76.10 Vitals BP Pulse Temp Resp Height(growth percentile) Weight(growth percentile) 109/76 69 97.8 °F (36.6 °C) (Oral) 20 5' 9\" (1.753 m) 208 lb 3.2 oz (94.4 kg) SpO2 BMI OB Status Smoking Status 96% 30.75 kg/m2 Menopause Never Smoker BMI and BSA Data Body Mass Index Body Surface Area 30.75 kg/m 2 2.14 m 2 Preferred Pharmacy Pharmacy Name Phone [address] [address]  [facility] [phone] Your Updated Medication List  
  
   
This list is accurate as of: 10/17/17  3:11 PM.  Always use your most recent med list.  
  
  
  
  
 buPROPion  mg XL tablet Commonly known as:  Genene [place] Take 300 mg by mouth every morning. cetirizine 10 mg tablet Commonly known as:  ZYRTEC Take 1 Tab by mouth nightly. ibuprofen 600 mg tablet Commonly known as:  MOTRIN Take 1 Tab by mouth every six (6) hours as needed for Pain.  
  
 lithium carbonate 300 mg tablet  
take 1 tablet by mouth every morning and 2 tablets at bedtime  
  
 mometasone 50 mcg/actuation nasal spray Commonly known as:  NASONEX  
2 Sprays by Both Nostrils route daily. risperiDONE 1 mg tablet Commonly known as:  RisperDAL Take  by mouth daily. Prescriptions Sent to Pharmacy Refills  
 cetirizine (ZYRTEC) 10 mg tablet 2 Sig: Take 1 Tab by mouth nightly. Class: Normal  
 Pharmacy: [address] Ph #: [phone] Route: Oral  
 ibuprofen (MOTRIN) 600 mg tablet 2 Sig: Take 1 Tab by mouth every six (6) hours as needed for Pain. Class: Normal  
 Pharmacy: [address] Ph #: [phone] Route: Oral  
 mometasone (NASONEX) 50 mcg/actuation nasal spray 5 Si Sprays by Both Nostrils route daily. Class: Normal  
 Pharmacy: [address] Ph #: [phone] Route: Both Nostrils We Performed the Following INFLUENZA VIRUS VAC QUAD,SPLIT,PRESV FREE SYRINGE IM [id number] CPT(R)] Follow-up Instructions Return in about 6 months (around 2018) for CPE. To-Do List   
 10/17/2017 Imaging:  [facility] MAMMO BI SCREENING INCL CAD Patient Instructions Knee Arthritis: Exercises Your Care Instructions Here are some examples of exercises for knee arthritis. Start each exercise slowly. Ease off the exercise if you start to have pain. Your doctor or physical therapist will tell you when you can start these exercises and which ones will work best for you. How to do the exercises Knee flexion with heel slide 1. Lie on your back with your knees bent. 2. Slide your heel back by bending your affected knee as far as you can. Then hook your other foot around your ankle to help pull your heel even farther back. 3. Hold for about 6 seconds, then rest for up to 10 seconds. 4. Repeat 8 to 12 times. 5. Switch legs and repeat steps 1 through 4, even if only one knee is sore. [facility] [employer] 1. Sit with your affected leg straight and supported on the floor or a firm bed. Place a small, rolled-up towel under your knee. Your other leg should be bent, with that foot flat on the floor. 2. Tighten the thigh muscles of your affected leg by pressing the back of your knee down into the towel. 3. Hold for about 6 seconds, then rest for up to 10 seconds. 4. Repeat 8 to 12 times. 5. Switch legs and repeat steps 1 through 4, even if only one knee is sore. Straight-leg raises to the front 1. Lie on your back with your good knee bent so that your foot rests flat on the floor. Your affected leg should be straight. Make sure that your low back has a normal curve. You should be able to slip your hand in between the floor and the small of your back, with your palm touching the floor and your back touching the back of your hand. 2. Tighten the thigh muscles in your affected leg by pressing the back of your knee flat down to the floor. Hold your knee straight. 3. Keeping the thigh muscles tight and your leg straight, lift your affected leg up so that your heel is about 12 inches off the floor. Hold for about 6 seconds, then lower slowly. 4. Relax for up to 10 seconds between repetitions. 5. Repeat 8 to 12 times. 6. Switch legs and repeat steps 1 through 5, even if only one knee is sore. Active knee flexion 1. Lie on your stomach with your knees straight. If your kneecap is uncomfortable, roll up a washcloth and put it under your leg just above your kneecap. 2. Lift the foot of your affected leg by bending the knee so that you bring the foot up toward your buttock. If this motion hurts, try it without bending your knee quite as far. This may help you avoid any painful motion. 3. Slowly move your leg up and down. 4. Repeat 8 to 12 times. 5. Switch legs and repeat steps 1 through 4, even if only one knee is sore. Quadriceps stretch (facedown) 1. Lie flat on your stomach, and rest your face on the floor. 2. Wrap a towel or belt strap around the lower part of your affected leg. Then use the towel or belt strap to slowly pull your heel toward your buttock until you feel a stretch. 3. Hold for about 15 to 30 seconds, then relax your leg against the towel or belt strap. 4. Repeat 2 to 4 times. 5. Switch legs and repeat steps 1 through 4, even if only one knee is sore. Stationary exercise bike If you do not have a stationary exercise bike at home, you can find one to ride at your local health club or community center. 1. Adjust the height of the bike seat so that your knee is slightly bent when your leg is extended downward. If your knee hurts when the pedal reaches the top, you can raise the seat so that your knee does not bend as much. 2. Start slowly. At first, try to do 5 to 10 minutes of cycling with little to no resistance. Then increase your time and the resistance bit by bit until you can do 20 to 30 minutes without pain. 3. If you start to have pain, rest your knee until your pain gets back to the level that is normal for you. Or cycle for less time or with less effort. Follow-up care is a key part of your treatment and safety. Be sure to make and go to all appointments, and call your doctor if you are having problems. It's also a good idea to know your test results and keep a list of the medicines you take. Where can you learn more? Go to http://gisell-lori.info/. Enter C159 in the search box to learn more about \"Knee Arthritis: Exercises. \" Current as of: March 21, 2017 Content Version: 11.3 © 6725-0351 [employer]. Care instructions adapted under license by [employer] (which disclaims liability or warranty for this information). If you have questions about a medical condition or this instruction, always ask your healthcare professional. [facility] any warranty or liability for your use of this information. Vaccine Information Statement Influenza (Flu) Vaccine (Inactivated or Recombinant): What you need to know Many Vaccine Information Statements are available in [place] and other languages. See www.immunize.org/vis Hojas de Información Sobre Vacunas están disponibles en Español y en muchos otros idiomas. Visite www.immunize.org/vis 1. Why get vaccinated? Influenza (flu) is a contagious disease that spreads around the [facility] every year, usually between October and May. Flu is caused by influenza viruses, and is spread mainly by coughing, sneezing, and close contact. Anyone can get flu. Flu strikes suddenly and can last several days. Symptoms vary by age, but can include: 
 fever/chills  sore throat  muscle aches  fatigue  cough  headache  runny or stuffy nose Flu can also lead to pneumonia and blood infections, and cause diarrhea and seizures in children. If you have a medical condition, such as heart or lung disease, flu can make it worse. Flu is more dangerous for some people. Infants and young children, people 72years of age and older, pregnant women, and people with certain health conditions or a weakened immune system are at greatest risk. Each year thousands of people in the [facility] die from flu, and many more are hospitalized.   
 
Flu vaccine can: 
 keep you from getting flu, 
 make flu less severe if you do get it, and 
  keep you from spreading flu to your family and other people. 2. Inactivated and recombinant flu vaccines A dose of flu vaccine is recommended every flu season. Children 6 months through 6years of age may need two doses during the same flu season. Everyone else needs only one dose each flu season. Some inactivated flu vaccines contain a very small amount of a mercury-based preservative called thimerosal. Studies have not shown thimerosal in vaccines to be harmful, but flu vaccines that do not contain thimerosal are available. There is no live flu virus in flu shots. They cannot cause the flu. There are many flu viruses, and they are always changing. Each year a new flu vaccine is made to protect against three or four viruses that are likely to cause disease in the upcoming flu season. But even when the vaccine doesnt exactly match these viruses, it may still provide some protection Flu vaccine cannot prevent: 
 flu that is caused by a virus not covered by the vaccine, or 
 illnesses that look like flu but are not. It takes about 2 weeks for protection to develop after vaccination, and protection lasts through the flu season. 3. Some people should not get this vaccine Tell the person who is giving you the vaccine:  If you have any severe, life-threatening allergies. If you ever had a life-threatening allergic reaction after a dose of flu vaccine, or have a severe allergy to any part of this vaccine, you may be advised not to get vaccinated. Most, but not all, types of flu vaccine contain a small amount of egg protein.  If you ever had Guillain-Barré Syndrome (also called GBS). Some people with a history of GBS should not get this vaccine. This should be discussed with your doctor.  If you are not feeling well. It is usually okay to get flu vaccine when you have a mild illness, but you might be asked to come back when you feel better. 4. Risks of a vaccine reaction With any medicine, including vaccines, there is a chance of reactions. These are usually mild and go away on their own, but serious reactions are also possible. Most people who get a flu shot do not have any problems with it. Minor problems following a flu shot include:  
 soreness, redness, or swelling where the shot was given  hoarseness  sore, red or itchy eyes  cough  fever  aches  headache  itching  fatigue If these problems occur, they usually begin soon after the shot and last 1 or 2 days. More serious problems following a flu shot can include the following:  There may be a small increased risk of Guillain-Barré Syndrome (GBS) after inactivated flu vaccine. This risk has been estimated at 1 or 2 additional cases per million people vaccinated. This is much lower than the risk of severe complications from flu, which can be prevented by flu vaccine.  Young children who get the flu shot along with pneumococcal vaccine (PCV13) and/or DTaP vaccine at the same time might be slightly more likely to have a seizure caused by fever. Ask your doctor for more information. Tell your doctor if a child who is getting flu vaccine has ever had a seizure. Problems that could happen after any injected vaccine:  People sometimes faint after a medical procedure, including vaccination. Sitting or lying down for about 15 minutes can help prevent fainting, and injuries caused by a fall. Tell your doctor if you feel dizzy, or have vision changes or ringing in the ears.  Some people get severe pain in the shoulder and have difficulty moving the arm where a shot was given. This happens very rarely.  Any medication can cause a severe allergic reaction. Such reactions from a vaccine are very rare, estimated at about 1 in a million doses, and would happen within a few minutes to a few hours after the vaccination. As with any medicine, there is a very remote chance of a vaccine causing a serious injury or death. The safety of vaccines is always being monitored. For more information, visit: www.cdc.gov/vaccinesafety/ 
 
5. What if there is a serious reaction? What should I look for?  Look for anything that concerns you, such as signs of a severe allergic reaction, very high fever, or unusual behavior. Signs of a severe allergic reaction can include hives, swelling of the face and throat, difficulty breathing, a fast heartbeat, dizziness, and weakness  usually within a few minutes to a few hours after the vaccination. What should I do?  If you think it is a severe allergic reaction or other emergency that cant wait, call 9-1-1 and get the person to the nearest hospital. Otherwise, call your doctor.  Reactions should be reported to the Vaccine Adverse Event Reporting System (VAERS). Your doctor should file this report, or you can do it yourself through  the VAERS web site at www.vaers. [facility].gov, or by calling 3-465.457.5292. VAERS does not give medical advice. 6. The National Vaccine Injury Compensation Program 
 
The [facility] Vaccine Injury Compensation Program (VICP) is a federal program that was created to compensate people who may have been injured by certain vaccines. Persons who believe they may have been injured by a vaccine can learn about the program and about filing a claim by calling 9-986.392.5906 or visiting the [employer] [facility] website at www.[facility].gov/vaccinecompensation. There is a time limit to file a claim for compensation. 7. How can I learn more?  Ask your healthcare provider. He or she can give you the vaccine package insert or suggest other sources of information.  Call your local or state health department.  Contact the Centers for Disease Control and Prevention (CDC): 
- Call 6-977.960.8800 (1-800-CDC-INFO) or 
- Visit CDCs website at www.cdc.gov/flu Vaccine Information Statement Inactivated Influenza Vaccine 8/7/2015 
42 GUSTAVO Fofana 382MD-32 Department of [facility] Centers for Disease Control and Prevention Office Use Only Introducing \[facility]\"" & HEALTH SERVICES! Kuldip Luong introduces [employer] patient portal. Now you can access parts of your medical record, email your doctor's office, and request medication refills online. 1. In your internet browser, go to [email or web address] 2. Click on the First Time User? Click Here link in the Sign In box. You will see the New Member Sign Up page. 3. Enter your [employer] Access Code exactly as it appears below. You will not need to use this code after youve completed the sign-up process. If you do not sign up before the expiration date, you must request a new code. · [employer] Access Code: [email or web address] Expires: 1/15/2018  2:12 PM 
 
4. Enter the last four digits of your Social Security Number (xxxx) and Date of Birth (mm/dd/yyyy) as indicated and click Submit. You will be taken to the next sign-up page. 5. Create a [employer] ID. This will be your [employer] login ID and cannot be changed, so think of one that is secure and easy to remember. 6. Create a [employer] password. You can change your password at any time. 7. Enter your Password Reset Question and Answer. This can be used at a later time if you forget your password. 8. Enter your e-mail address. You will receive e-mail notification when new information is available in [address] 9. Click Sign Up. You can now view and download portions of your medical record. 10. Click the Download Summary menu link to download a portable copy of your medical information. If you have questions, please visit the Frequently Asked Questions section of the [employer] website. Remember, [employer] is NOT to be used for urgent needs. For medical emergencies, dial 911. Now available from your iPhone and Android! Please provide this summary of care documentation to your next provider. Your primary care clinician is listed as Alireza Sofia. If you have any questions after today's visit, please call [phone].

## 2017-10-17 NOTE — PROGRESS NOTES
1. Have you been to the ER, urgent care clinic since your last visit? Hospitalized since your last visit? No    2. Have you seen or consulted any other health care providers outside of the [address] [place] [address] since your last visit? Include any pap smears or colon screening.  No

## 2018-03-08 ENCOUNTER — HOSPITAL ENCOUNTER (OUTPATIENT)
Dept: LAB | Age: 53
Discharge: HOME | End: 2018-03-08
Payer: MEDICARE

## 2018-03-08 ENCOUNTER — OFFICE VISIT (OUTPATIENT)
Dept: FAMILY MEDICINE CLINIC | Age: 53
End: 2018-03-08

## 2018-03-08 VITALS
WEIGHT: 205.6 LBS | SYSTOLIC BLOOD PRESSURE: 120 MMHG | DIASTOLIC BLOOD PRESSURE: 80 MMHG | HEIGHT: 69 IN | TEMPERATURE: 98 F | RESPIRATION RATE: 20 BRPM | OXYGEN SATURATION: 98 % | BODY MASS INDEX: 30.45 KG/M2 | HEART RATE: 65 BPM

## 2018-03-08 DIAGNOSIS — N39.0 UTI (URINARY TRACT INFECTION), UNCOMPLICATED: Primary | ICD-10-CM

## 2018-03-08 LAB
APPEARANCE UR: ABNORMAL
BACTERIA URNS QL MICRO: ABNORMAL /HPF
BILIRUB UR QL STRIP: NEGATIVE
BILIRUB UR QL: NEGATIVE
COLOR UR: YELLOW
EPITH CASTS URNS QL MICRO: ABNORMAL /LPF (ref 0–5)
GLUCOSE UR STRIP.AUTO-MCNC: NEGATIVE MG/DL
GLUCOSE UR-MCNC: NEGATIVE MG/DL
HGB UR QL STRIP: NEGATIVE
KETONES P FAST UR STRIP-MCNC: NEGATIVE MG/DL
KETONES UR QL STRIP.AUTO: NEGATIVE MG/DL
LEUKOCYTE ESTERASE UR QL STRIP.AUTO: ABNORMAL
NITRITE UR QL STRIP.AUTO: NEGATIVE
PH UR STRIP: 5 [PH] (ref 5–8)
PH UR STRIP: 5.5 [PH] (ref 4.6–8)
PROT UR QL STRIP: NEGATIVE
PROT UR STRIP-MCNC: NEGATIVE MG/DL
RBC #/AREA URNS HPF: 0 /HPF (ref 0–5)
SP GR UR REFRACTOMETRY: 1.02 (ref 1–1.03)
SP GR UR STRIP: 1.02 (ref 1–1.03)
UA UROBILINOGEN AMB POC: NORMAL (ref 0.2–1)
URINALYSIS CLARITY POC: NORMAL
URINALYSIS COLOR POC: YELLOW
URINE BLOOD POC: NORMAL
URINE LEUKOCYTES POC: NORMAL
URINE NITRITES POC: NEGATIVE
UROBILINOGEN UR QL STRIP.AUTO: 0.2 EU/DL (ref 0.2–1)
WBC URNS QL MICRO: ABNORMAL /HPF (ref 0–4)
YEAST URNS QL MICRO: ABNORMAL

## 2018-03-08 PROCEDURE — 87077 CULTURE AEROBIC IDENTIFY: CPT | Performed by: INTERNAL MEDICINE

## 2018-03-08 PROCEDURE — 81001 URINALYSIS AUTO W/SCOPE: CPT | Performed by: INTERNAL MEDICINE

## 2018-03-08 PROCEDURE — 87086 URINE CULTURE/COLONY COUNT: CPT | Performed by: INTERNAL MEDICINE

## 2018-03-08 RX ORDER — SULFAMETHOXAZOLE AND TRIMETHOPRIM 800; 160 MG/1; MG/1
1 TABLET ORAL 2 TIMES DAILY
Qty: 10 TAB | Refills: 0 | Status: SHIPPED | OUTPATIENT
Start: 2018-03-08 | End: 2018-03-13

## 2018-03-08 NOTE — PROGRESS NOTES
History of Present Illness  Nova Patel is a 47 y.o. female who presents today for management of    Chief Complaint   Patient presents with    Urinary Frequency       Urinary Tract Infection  Patient complains of dysuria, urgency. Onset was 5 days ago, unchanged since that time. Patient complains of vaginal itching. Patient denies fever, back pain, vaginal dicharge. There is not any concern of sexual abuse. There is not a history of trauma to the genital area. Patient does not have a history of recurrent UTI. Patient does not have a history of pyelonephritis. Problem List  Patient Active Problem List    Diagnosis Date Noted    Chronic pain of right knee 10/17/2017    Chronic seasonal allergic rhinitis due to pollen 10/17/2017       Past Medical History  Past Medical History:   Diagnosis Date    Arthritis     Bipolar 1 disorder ([facility] [address]     Psychotic disorder     Bipolar disorder        Surgical History  Past Surgical History:   Procedure Laterality Date    HX  SECTION      x2    HX OTHER SURGICAL      knee surgery    HX TUBAL LIGATION          Current Medications  Current Outpatient Prescriptions   Medication Sig    trimethoprim-sulfamethoxazole (BACTRIM DS, SEPTRA DS) 160-800 mg per tablet Take 1 Tab by mouth two (2) times a day for 5 days.  lithium carbonate 300 mg tablet take 1 tablet by mouth every morning and 2 tablets at bedtime    risperiDONE (RISPERDAL) 1 mg tablet Take  by mouth daily.  buPROPion XL (WELLBUTRIN XL) 300 mg XL tablet Take 300 mg by mouth every morning.  cetirizine (ZYRTEC) 10 mg tablet Take 1 Tab by mouth nightly.  ibuprofen (MOTRIN) 600 mg tablet Take 1 Tab by mouth every six (6) hours as needed for Pain.  mometasone (NASONEX) 50 mcg/actuation nasal spray 2 Sprays by Both Nostrils route daily. No current facility-administered medications for this visit.         Allergies/Drug Reactions  No Known Allergies     Family History  Family History Problem Relation Age of Onset    Bipolar Disorder Mother     Cancer Father     Diabetes Paternal Grandmother         Social History  Social History     Social History    Marital status:      Spouse name: N/A    Number of children: N/A    Years of education: N/A     Occupational History    Not on file. Social History Main Topics    Smoking status: Never Smoker    Smokeless tobacco: Never Used    Alcohol use No    Drug use: No    Sexual activity: Yes     Partners: Male     Other Topics Concern    Not on file     Social History Narrative       Review of Systems  Negative except as mentioned in HPI      Physical Exam  Vital signs:   Vitals:    03/08/18 1024 03/08/18 1032   BP: 113/90 120/80   Pulse: 65    Resp: 20    Temp: 98 °F (36.7 °C)    TempSrc: Oral    SpO2: 98%    Weight: 205 lb 9.6 oz (93.3 kg)    Height: 5' 9\" (1.753 m)        General: alert, oriented, not in distress  Chest/Lungs: clear breath sounds, no wheezing or crackles  Heart: normal rate, regular rhythm, no murmur  Abdomen: soft, non-distended, non-tender, normal bowel sounds, no organomegaly, no masses, CVA tenderness  Extremities: no focal deformities, no edema    Laboratory/Tests:  POC Urinalysis Leucocyte 3+, Blood trace    Assessment/Plan:      ICD-10-CM ICD-9-CM    1. UTI (urinary tract infection), uncomplicated W63.6 235.7 URINALYSIS W/ RFLX MICROSCOPIC      CULTURE, URINE      trimethoprim-sulfamethoxazole (BACTRIM DS, SEPTRA DS) 160-800 mg per tablet           Follow-up Disposition:  Return if symptoms worsen or fail to improve. I have discussed the diagnosis with the patient and the intended plan as seen in the above orders. The patient has received an after-visit summary and questions were answered concerning future plans. I have discussed medication side effects and warnings with the patient as well.  I have reviewed the plan of care with the patient, accepted their input and they are in agreement with the treatment goals.        Lisa Good MD  March 8, 2018

## 2018-03-08 NOTE — MR AVS SNAPSHOT
[address] [address] 
[phone] Patient: Sam Harris MRN: [id number] LVQ:0[birth year] Visit Information Date & Time Provider Department Dept. Phone Encounter #  
 3/8/2018 10:15 AM Wilian Lopez, [address] [facility] [phone] [id number] Follow-up Instructions Return if symptoms worsen or fail to improve, for cpe. Follow-up and Disposition History Upcoming Health Maintenance Date Due FOBT Q 1 YEAR AGE 50-75 8/7/2014 PAP AKA CERVICAL CYTOLOGY 3/28/2015 BREAST CANCER SCRN MAMMOGRAM 11/12/2017 DTaP/Tdap/Td series (2 - Td) 3/29/2027 Allergies as of 3/8/2018  Review Complete On: 3/8/2018 By: Wilian Lopez MD  
 No Known Allergies Current Immunizations  Never Reviewed Name Date Influenza Vaccine (Quad) PF 10/17/2017 Tdap 3/29/2017 Not reviewed this visit You Were Diagnosed With   
  
 Codes Comments UTI (urinary tract infection), uncomplicated    -  Primary ICD-10-CM: N39.0 ICD-9-CM: 599.0 Vitals BP Pulse Temp Resp Height(growth percentile) Weight(growth percentile) 120/80 65 98 °F (36.7 °C) (Oral) 20 5' 9\" (1.753 m) 205 lb 9.6 oz (93.3 kg) SpO2 BMI OB Status Smoking Status 98% 30.36 kg/m2 Menopause Never Smoker Vitals History BMI and BSA Data Body Mass Index Body Surface Area  
 30.36 kg/m 2 2.13 m 2 Preferred Pharmacy Pharmacy Name Phone [facility] [address], [address] Your Updated Medication List  
  
   
This list is accurate as of 3/8/18 10:39 AM.  Always use your most recent med list.  
  
  
  
  
 buPROPion  mg XL tablet Commonly known as:  [place] Take 300 mg by mouth every morning. cetirizine 10 mg tablet Commonly known as:  ZYRTEC Take 1 Tab by mouth nightly. ibuprofen 600 mg tablet Commonly known as:  MOTRIN Take 1 Tab by mouth every six (6) hours as needed for Pain.  
  
 lithium carbonate 300 mg tablet  
take 1 tablet by mouth every morning and 2 tablets at bedtime  
  
 mometasone 50 mcg/actuation nasal spray Commonly known as:  NASONEX  
2 Sprays by Both Nostrils route daily. risperiDONE 1 mg tablet Commonly known as:  RisperDAL Take  by mouth daily. trimethoprim-sulfamethoxazole 160-800 mg per tablet Commonly known as:  BACTRIM DS, SEPTRA DS Take 1 Tab by mouth two (2) times a day for 5 days. Prescriptions Sent to Pharmacy Refills  
 trimethoprim-sulfamethoxazole (BACTRIM DS, SEPTRA DS) 160-800 mg per tablet 0 Sig: Take 1 Tab by mouth two (2) times a day for 5 days. Class: Normal  
 [facility] [address] [facility]  #: [phone] Route: Oral  
  
Follow-up Instructions Return if symptoms worsen or fail to improve, for cpe. To-Do List   
 03/08/2018 Microbiology:  CULTURE, URINE Around 03/08/2018 Lab:  URINALYSIS W/ RFLX MICROSCOPIC Introducing [facility] & HEALTH SERVICES! Amauri Eubanks introduces [employer] patient portal. Now you can access parts of your medical record, email your doctor's office, and request medication refills online. 1. In your internet browser, go to [email or web address] 2. Click on the First Time User? Click Here link in the Sign In box. You will see the New Member Sign Up page. 3. Enter your [employer] Access Code exactly as it appears below. You will not need to use this code after youve completed the sign-up process. If you do not sign up before the expiration date, you must request a new code. · [employer] Access Code: [email or web address] Expires: 6/6/2018 10:34 AM 
 
4. Enter the last four digits of your Social Security Number (xxxx) and Date of Birth (mm/dd/yyyy) as indicated and click Submit.  You will be taken to the next sign-up page. 5. Create a [email or web address] ID. This will be your [email or web address] login ID and cannot be changed, so think of one that is secure and easy to remember. 6. Create a [email or web address] password. You can change your password at any time. 7. Enter your Password Reset Question and Answer. This can be used at a later time if you forget your password. 8. Enter your e-mail address. You will receive e-mail notification when new information is available in [address] 9. Click Sign Up. You can now view and download portions of your medical record. 10. Click the Download Summary menu link to download a portable copy of your medical information. If you have questions, please visit the Frequently Asked Questions section of the [email or web address] website. Remember, [email or web address] is NOT to be used for urgent needs. For medical emergencies, dial 911. Now available from your iPhone and Android! Please provide this summary of care documentation to your next provider. Your primary care clinician is listed as Erika Lucero. If you have any questions after today's visit, please call [phone].

## 2018-03-12 ENCOUNTER — TELEPHONE (OUTPATIENT)
Dept: FAMILY MEDICINE CLINIC | Age: 53
End: 2018-03-12

## 2018-03-12 DIAGNOSIS — N39.0 UTI (URINARY TRACT INFECTION), UNCOMPLICATED: Primary | ICD-10-CM

## 2018-03-12 LAB
BACTERIA SPEC CULT: ABNORMAL
SERVICE CMNT-IMP: ABNORMAL

## 2018-03-12 RX ORDER — FLUCONAZOLE 150 MG/1
150 TABLET ORAL
Qty: 2 TAB | Refills: 0 | Status: SHIPPED | OUTPATIENT
Start: 2018-03-12 | End: 2018-03-16

## 2018-05-11 ENCOUNTER — DOCUMENTATION ONLY (OUTPATIENT)
Dept: FAMILY MEDICINE CLINIC | Age: 53
End: 2018-05-11

## 2018-05-11 NOTE — LETTER
5/11/2018 Brooklynlorelei Munoz Rhina Mckeon [address] [phone] Dear Ms. Clifford Munoz, We had an appointment reserved for you 5/8/2018 and were concerned when you did not show or call within 24 hours to cancel the appointment. Our policy is to call patients two days prior to their appointment to remind them of the date and time. We perform these calls as a courtesy to our patients and to allow us the opportunity to rebook the time slot should the appointment not be necessary. Recognizing that everyones time is valuable and that appointment time is limited, we ask that you provide 24 hours notice if you are unable to keep your appointment. Please call us at your earliest convenience to reschedule your appointment as your provider felt it was important to see you. Thank you for your anticipated cooperation. The scheduling staff: 
 
[address] [place] [address],[facility] Floor [address] [place] [address] [phone]

## 2018-06-06 ENCOUNTER — OFFICE VISIT (OUTPATIENT)
Dept: FAMILY MEDICINE CLINIC | Age: 53
End: 2018-06-06

## 2018-06-06 VITALS
RESPIRATION RATE: 18 BRPM | DIASTOLIC BLOOD PRESSURE: 63 MMHG | OXYGEN SATURATION: 98 % | BODY MASS INDEX: 30.54 KG/M2 | SYSTOLIC BLOOD PRESSURE: 102 MMHG | WEIGHT: 206.2 LBS | HEIGHT: 69 IN | HEART RATE: 79 BPM | TEMPERATURE: 96.9 F

## 2018-06-06 DIAGNOSIS — Z00.00 INITIAL MEDICARE ANNUAL WELLNESS VISIT: ICD-10-CM

## 2018-06-06 DIAGNOSIS — R07.9 RIGHT-SIDED CHEST PAIN: ICD-10-CM

## 2018-06-06 DIAGNOSIS — Z12.39 SCREENING FOR MALIGNANT NEOPLASM OF BREAST: ICD-10-CM

## 2018-06-06 DIAGNOSIS — M54.6 ACUTE RIGHT-SIDED THORACIC BACK PAIN: ICD-10-CM

## 2018-06-06 DIAGNOSIS — J30.1 CHRONIC SEASONAL ALLERGIC RHINITIS DUE TO POLLEN: Primary | ICD-10-CM

## 2018-06-06 DIAGNOSIS — M25.561 CHRONIC PAIN OF RIGHT KNEE: ICD-10-CM

## 2018-06-06 DIAGNOSIS — F31.9 BIPOLAR 1 DISORDER (HCC): ICD-10-CM

## 2018-06-06 DIAGNOSIS — Z79.899 LITHIUM USE: ICD-10-CM

## 2018-06-06 DIAGNOSIS — G89.29 CHRONIC PAIN OF RIGHT KNEE: ICD-10-CM

## 2018-06-06 DIAGNOSIS — Z13.39 SCREENING FOR ALCOHOLISM: ICD-10-CM

## 2018-06-06 RX ORDER — CETIRIZINE HCL 10 MG
10 TABLET ORAL
Qty: 200 TAB | Refills: 0 | Status: SHIPPED | OUTPATIENT
Start: 2018-06-06 | End: 2018-11-21

## 2018-06-06 RX ORDER — DICLOFENAC SODIUM 10 MG/G
2 GEL TOPICAL EVERY 6 HOURS
Qty: 100 G | Refills: 0 | Status: SHIPPED | OUTPATIENT
Start: 2018-06-06 | End: 2018-08-22 | Stop reason: SDUPTHER

## 2018-06-06 RX ORDER — IBUPROFEN 600 MG/1
600 TABLET ORAL
Qty: 100 TAB | Refills: 2 | Status: SHIPPED | OUTPATIENT
Start: 2018-06-06 | End: 2018-08-22 | Stop reason: SDUPTHER

## 2018-06-06 NOTE — PROGRESS NOTES
Chief Complaint   Patient presents with   [facility] Annual Wellness Visit         Medication Refill     ibu     1. Have you been to the ER, urgent care clinic since your last visit? Hospitalized since your last visit? No    2. Have you seen or consulted any other health care providers outside of the [address] [place] [address] since your last visit? Include any pap smears or colon screening.  Psych

## 2018-06-06 NOTE — PATIENT INSTRUCTIONS
Medicare Wellness Visit, Female    The best way to live healthy is to have a lifestyle where you eat a well-balanced diet, exercise regularly, limit alcohol use, and quit all forms of tobacco/nicotine, if applicable. Regular preventive services are another way to keep healthy. Preventive services (vaccines, screening tests, monitoring & exams) can help personalize your care plan, which helps you manage your own care. Screening tests can find health problems at the earliest stages, when they are easiest to treat. [address] [facility] follows the current, evidence-based guidelines published by the [facility] Bucky Lavell ([facility]) when recommending preventive services for our patients. Because we follow these guidelines, sometimes recommendations change over time as research supports it. (For example, mammograms used to be recommended annually. Even though Medicare will still pay for an annual mammogram, the newer guidelines recommend a mammogram every two years for women of average risk.)    Of course, you and your provider may decide to screen more often for some diseases, based on your risk and co-morbidities (chronic disease you are already diagnosed with). Preventive services for you include:    - Medicare offers their members a free annual wellness visit, which is time for you and your primary care provider to discuss and plan for your preventive service needs. Take advantage of this benefit every year!    -All people over age 72 should receive the recommended pneumonia vaccines. Current USPSTF guidelines recommend a series of two vaccines for the best pneumonia protection.     -All adults should have a yearly flu vaccine and a tetanus vaccine every 10 years. All adults age 61 years should receive a shingles vaccine once in their lifetime.      -A bone mass density test is recommended when a woman turns 65 to screen for osteoporosis.  This test is only recommended once as a screening. Some providers will use this same test as a disease monitoring tool if you already have osteoporosis. -All adults age 38-68 years who are overweight should have a diabetes screening test once every three years.     -Other screening tests & preventive services for persons with diabetes include: an eye exam to screen for diabetic retinopathy, a kidney function test, a foot exam, and stricter control over your cholesterol.     -Cardiovascular screening for adults with routine risk involves an electrocardiogram (ECG) at intervals determined by the provider.     -Colorectal cancer screenings should be done for adults age 54-65 years with normal risk. There are a number of acceptable methods of screening for this type of cancer. Each test has its own benefits and drawbacks. Discuss with your provider what is most appropriate for you during your annual wellness visit. The different tests include: colonoscopy (considered the best screening method), a fecal occult blood test, a fecal DNA test, and sigmoidoscopy. -Breast cancer screenings are recommended every other year for women of normal risk age 54-69 years.     -Cervical cancer screenings for women over age 72 are only recommended with certain risk factors.     -All adults born between [facility] should be screened once for Hepatitis C.      Here is a list of your current Health Maintenance items (your personalized list of preventive services) with a due date:  Health Maintenance Due   Topic Date Due    Stool testing for trace blood  08/07/2014    Cervical Cancer Screening  03/28/2015    Breast Cancer Screening  11/12/2017    Annual Well Visit  03/30/2018

## 2018-06-06 NOTE — MR AVS SNAPSHOT
[facility] 
 
 
 [address] [facility] [address] [address] 
[phone] Patient: Ruchi Devine MRN: [id number] IJQ:8[birth year] Visit Information Date & Time Provider Department Dept. Phone Encounter #  
 6/6/2018  9:30 AM Bernardino Heller Yulissa Hinds [id number] Follow-up Instructions Return in about 1 year (around 6/6/2019), or as needed, for annual physical.  
 Follow-up and Disposition History Upcoming Health Maintenance Date Due FOBT Q 1 YEAR AGE 50-75 8/7/2014 PAP AKA CERVICAL CYTOLOGY 3/28/2015 BREAST CANCER SCRN MAMMOGRAM 11/12/2017 MEDICARE YEARLY EXAM 3/30/2018 Influenza Age 5 to Adult 8/1/2018 DTaP/Tdap/Td series (2 - Td) 3/29/2027 Allergies as of 6/6/2018  Review Complete On: 6/6/2018 By: Naila Rivas MD  
 No Known Allergies Current Immunizations  Never Reviewed Name Date Influenza Vaccine (Quad) PF 10/17/2017 Tdap 3/29/2017 Not reviewed this visit You Were Diagnosed With   
  
 Codes Comments Chronic seasonal allergic rhinitis due to pollen    -  Primary ICD-10-CM: J30.1 ICD-9-CM: 477.0 Chronic pain of right knee     ICD-10-CM: M25.561, G89.29 ICD-9-CM: 719.46, 338.29 Lithium use     ICD-10-CM: B19.120 ICD-9-CM: V58.69 Bipolar 1 disorder (HCC)     ICD-10-CM: F31.9 ICD-9-CM: 296.7 Right-sided chest pain     ICD-10-CM: R07.9 ICD-9-CM: 786.50 Initial Medicare annual wellness visit     ICD-10-CM: Z00.00 ICD-9-CM: V70.0 Screening for alcoholism     ICD-10-CM: Z13.89 ICD-9-CM: V79.1 Screening for malignant neoplasm of breast     ICD-10-CM: Z12.31 
ICD-9-CM: V76.10 Acute right-sided thoracic back pain     ICD-10-CM: M54.6 ICD-9-CM: 724.1 Vitals BP Pulse Temp Resp Height(growth percentile) Weight(growth percentile)  102/63 (BP 1 Location: Right arm, BP Patient Position: At rest) 79 96.9 °F (36.1 °C) (Oral) 18 5' 9\" (1.753 m) 206 lb 3.2 oz (93.5 kg) SpO2 BMI OB Status Smoking Status 98% 30.45 kg/m2 Menopause Never Smoker Vitals History BMI and BSA Data Body Mass Index Body Surface Area  
 30.45 kg/m 2 2.13 m 2 Preferred Pharmacy Pharmacy Name Phone [facility] [address] [phone] Your Updated Medication List  
  
   
This list is accurate as of 6/6/18  9:58 AM.  Always use your most recent med list.  
  
  
  
  
 buPROPion  mg XL tablet Commonly known as:  [place] Inessa Take 300 mg by mouth every morning. cetirizine 10 mg tablet Commonly known as:  ZYRTEC Take 1 Tab by mouth nightly. diclofenac 1 % Gel Commonly known as:  VOLTAREN Apply 2 g to affected area every six (6) hours. ibuprofen 600 mg tablet Commonly known as:  MOTRIN Take 1 Tab by mouth every six (6) hours as needed for Pain.  
  
 lithium carbonate 300 mg tablet  
take 1 tablet by mouth every morning and 2 tablets at bedtime  
  
 risperiDONE 1 mg tablet Commonly known as:  RisperDAL Take  by mouth daily. Prescriptions Sent to Pharmacy Refills  
 ibuprofen (MOTRIN) 600 mg tablet 2 Sig: Take 1 Tab by mouth every six (6) hours as needed for Pain. Class: Normal  
 Pharmacy: [address] Ph #: [phone] Route: Oral  
 cetirizine (ZYRTEC) 10 mg tablet 0 Sig: Take 1 Tab by mouth nightly. Class: Normal  
 Pharmacy: [facility] [address] Ph #: [phone] Route: Oral  
 diclofenac (VOLTAREN) 1 % gel 0 Sig: Apply 2 g to affected area every six (6) hours. Class: Normal  
 Pharmacy: [address] Ph #: [phone] Route: Topical  
  
We Performed the Following NM ANNUAL ALCOHOL SCREEN 15 MIN [id number] [facility]] Follow-up Instructions Return in about 1 year (around 6/6/2019), or as needed, for annual physical.  
  
To-Do List   
 06/06/2018 Lab:  CBC WITH AUTOMATED DIFF   
  
 06/06/2018 Lab:  LIPID PANEL   
  
 06/06/2018 Imaging:  GALINA MAMMO BI SCREENING INCL CAD   
  
 06/06/2018 Lab:  METABOLIC PANEL, COMPREHENSIVE   
  
 06/06/2018 Lab:  TSH 3RD GENERATION Around 06/06/2018 Lab:  URINALYSIS W/ RFLX MICROSCOPIC Patient Instructions Medicare Wellness Visit, Female The best way to live healthy is to have a lifestyle where you eat a well-balanced diet, exercise regularly, limit alcohol use, and quit all forms of tobacco/nicotine, if applicable. Regular preventive services are another way to keep healthy. Preventive services (vaccines, screening tests, monitoring & exams) can help personalize your care plan, which helps you manage your own care. Screening tests can find health problems at the earliest stages, when they are easiest to treat. [address] [facility] follows the current, evidence-based guidelines published by the [facility]ucky [facility] ([facility]) when recommending preventive services for our patients. Because we follow these guidelines, sometimes recommendations change over time as research supports it. (For example, mammograms used to be recommended annually. Even though Medicare will still pay for an annual mammogram, the newer guidelines recommend a mammogram every two years for women of average risk.) Of course, you and your provider may decide to screen more often for some diseases, based on your risk and co-morbidities (chronic disease you are already diagnosed with). Preventive services for you include: - Medicare offers their members a free annual wellness visit, which is time for you and your primary care provider to discuss and plan for your preventive service needs. Take advantage of this benefit every year! -All people over age 72 should receive the recommended pneumonia vaccines. Current USPSTF guidelines recommend a series of two vaccines for the best pneumonia protection.  
 
-All adults should have a yearly flu vaccine and a tetanus vaccine every 10 years. All adults age 61 years should receive a shingles vaccine once in their lifetime.   
 
-A bone mass density test is recommended when a woman turns 65 to screen for osteoporosis. This test is only recommended once as a screening. Some providers will use this same test as a disease monitoring tool if you already have osteoporosis. -All adults age 38-68 years who are overweight should have a diabetes screening test once every three years.  
 
-Other screening tests & preventive services for persons with diabetes include: an eye exam to screen for diabetic retinopathy, a kidney function test, a foot exam, and stricter control over your cholesterol.  
 
-Cardiovascular screening for adults with routine risk involves an electrocardiogram (ECG) at intervals determined by the provider.  
 
-Colorectal cancer screenings should be done for adults age 54-65 years with normal risk. There are a number of acceptable methods of screening for this type of cancer. Each test has its own benefits and drawbacks. Discuss with your provider what is most appropriate for you during your annual wellness visit. The different tests include: colonoscopy (considered the best screening method), a fecal occult blood test, a fecal DNA test, and sigmoidoscopy. -Breast cancer screenings are recommended every other year for women of normal risk age 54-69 years.  
 
-Cervical cancer screenings for women over age 72 are only recommended with certain risk factors.  
 
-All adults born between [facility] should be screened once for Hepatitis C. Here is a list of your current Health Maintenance items (your personalized list of preventive services) with a due date: Health Maintenance Due Topic Date Due  Stool testing for trace blood  08/07/2014  Cervical Cancer Screening  03/28/2015  Breast Cancer Screening  11/12/2017 [address] [place] [address] Annual Well Visit  03/30/2018 Introducing [facility] & HEALTH SERVICES! [facility] introduces [employer] patient portal. Now you can access parts of your medical record, email your doctor's office, and request medication refills online. 1. In your internet browser, go to [email or web address] 2. Click on the First Time User? Click Here link in the Sign In box. You will see the New Member Sign Up page. 3. Enter your [employer] Access Code exactly as it appears below. You will not need to use this code after youve completed the sign-up process. If you do not sign up before the expiration date, you must request a new code. · [employer] Access Code: [email or web address] Expires: 6/6/2018 11:34 AM 
 
4. Enter the last four digits of your Social Security Number (xxxx) and Date of Birth (mm/dd/yyyy) as indicated and click Submit. You will be taken to the next sign-up page. 5. Create a [employer] ID. This will be your [employer] login ID and cannot be changed, so think of one that is secure and easy to remember. 6. Create a [employer] password. You can change your password at any time. 7. Enter your Password Reset Question and Answer. This can be used at a later time if you forget your password. 8. Enter your e-mail address. You will receive e-mail notification when new information is available in [address] 9. Click Sign Up. You can now view and download portions of your medical record. 10. Click the Download Summary menu link to download a portable copy of your medical information. If you have questions, please visit the Frequently Asked Questions section of the [employer] website. Remember, [employer] is NOT to be used for urgent needs. For medical emergencies, dial 911. Now available from your iPhone and Android! Please provide this summary of care documentation to your next provider. Your primary care clinician is listed as Angely Duty. If you have any questions after today's visit, please call [phone].

## 2018-06-06 NOTE — PROGRESS NOTES
History of Present Illness  [place] Misty Durant is a 47 y.o. female who presents today for management of    Chief Complaint   Patient presents with    Annual Wellness Visit         Medication Refill     ibu       Side Pain  Patient presents for presents evaluation of right side pain  Symptoms have been present for 1 week and include pain in right lower rib area (aching in character; 7/10 in severity). Initial inciting event: none. Symptoms are worst: evening. Alleviating factors identifiable by patient are medication ibuprofen. Exacerbating factors identifiable by patient are none. Treatments so far initiated by patient: ibuprofen. Knee Pain  Patient complains of right knee pain. Onset of the symptoms was problem is longstanding. Inciting event: none known. Current symptoms include pain location: right: lateral and swelling: intermittent. Associated symptoms: effusion. Aggravating symptoms: going up and down stairs, standing, walking. Patient's overall course: intermittent Patient has had prior knee problems. She has right knee arthroscopy over 10 years ago. Patient denies difficulty swallowing, weight loss, melena, hematochezia. Evaluation to date: none. Treatment to date: ibuprofen. Problem List  Patient Active Problem List    Diagnosis Date Noted    Lithium use 2018    B[facility] ([facility] [address]     Chronic pain of right knee 10/17/2017    Chronic seasonal allergic rhinitis due to pollen 10/17/2017       Past Medical History  Past Medical History:   Diagnosis Date    Arthritis     Bipolar 1 disorder ([address]     Psychotic disorder     Bipolar disorder        Surgical History  Past Surgical History:   Procedure Laterality Date    HX  SECTION      x2    HX KNEE ARTHROSCOPY      HX TUBAL LIGATION          Current Medications  Current Outpatient Prescriptions   Medication Sig    ibuprofen (MOTRIN) 600 mg tablet Take 1 Tab by mouth every six (6) hours as needed for Pain.     cetirizine (ZYRTEC) 10 mg tablet Take 1 Tab by mouth nightly.  lithium carbonate 300 mg tablet take 1 tablet by mouth every morning and 2 tablets at bedtime    risperiDONE (RISPERDAL) 1 mg tablet Take  by mouth daily.  buPROPion XL (WELLBUTRIN XL) 300 mg XL tablet Take 300 mg by mouth every morning. No current facility-administered medications for this visit. Allergies/Drug Reactions  No Known Allergies     Family History  Family History   Problem Relation Age of Onset    Bipolar Disorder Mother     Cancer Father     Diabetes Paternal Grandmother         Social History  Social History     Social History    Marital status:      Spouse name: N/A    Number of children: N/A    Years of education: N/A     Occupational History    Not on file.      Social History Main Topics    Smoking status: Never Smoker    Smokeless tobacco: Never Used    Alcohol use No    Drug use: No    Sexual activity: Yes     Partners: Male     Other Topics Concern    Not on file     Social History Narrative       Review of Systems  General ROS: negative for - chills, fatigue or fever  Psychological ROS: negative for - anxiety or depression  Ophthalmic ROS: negative for - blurry vision, decreased vision or double vision  Hematological and Lymphatic ROS: negative  Endocrine ROS: negative  Breast ROS: negative for breast lumps  Respiratory ROS: no cough, shortness of breath, or wheezing  Cardiovascular ROS: no chest pain or dyspnea on exertion  Gastrointestinal ROS: no abdominal pain, change in bowel habits, or black or bloody stools  Genito-Urinary ROS: no dysuria, trouble voiding, or hematuria  Musculoskeletal ROS: positive for - pain in knee - right and trunk-right  Neurological ROS: negative  Dermatological ROS: negative      Physical Exam  Vital signs:   Vitals:    06/06/18 0933   BP: 102/63   Pulse: 79   Resp: 18   Temp: 96.9 °F (36.1 °C)   TempSrc: Oral   SpO2: 98%   Weight: 206 lb 3.2 oz (93.5 kg)   Height: 5' 9\" (1.753 m)       General: alert, oriented, not in distress  HEENT:  Head: Normocephalic. Right Ear: External ear normal. Patent ear canal, TM intact. Left Ear: External ear normal. Patent ear canal, TM intact. Nose: Nose normal.   Mouth/Throat: Oropharynx is clear and moist. No oropharyngeal exudate. Eyes: Conjunctivae and EOM are normal. No scleral icterus. Neck: Normal range of motion. Neck supple. No thyromegaly present. Lymphadenopathy: no cervical adenopathy. Chest/Lungs: (+) TTP over right thoracic and lower costophrenic angle, clear breath sounds, no wheezing or crackles  Heart: normal rate, regular rhythm, no murmur  Abdomen: soft, non-distended, non-tender, normal bowel sounds, no organomegaly, no masses  Extremities: no focal deformities, no edema    Laboratory/Tests:  Routine labs ordered    Assessment/Plan:        ICD-10-CM ICD-9-CM    1. Chronic seasonal allergic rhinitis due to pollen J30.1 477.0 cetirizine (ZYRTEC) 10 mg tablet   2. Chronic pain of right knee M25.561 719.46 ibuprofen (MOTRIN) 600 mg tablet    G89.29 338.29    3. Lithium use Z79.899 V58.69 CBC WITH AUTOMATED DIFF      LIPID PANEL      METABOLIC PANEL, COMPREHENSIVE      TSH 3RD GENERATION      URINALYSIS W/ RFLX MICROSCOPIC   4. Bipolar 1 disorder (HCC) F31.9 296.7 CBC WITH AUTOMATED DIFF      LIPID PANEL      METABOLIC PANEL, COMPREHENSIVE      TSH 3RD GENERATION      URINALYSIS W/ RFLX MICROSCOPIC   5. Right-sided chest pain R07.9 786.50    6. Initial Medicare annual wellness visit Z00.00 V70.0    7. Screening for alcoholism Z13.89 V79.1 [place] ANNUAL ALCOHOL SCREEN 15 MIN   8. Screening for malignant neoplasm of breast Z12.31 V76.10 GALINA MAMMO BI SCREENING INCL CAD   9.  Acute right-sided thoracic back pain M54.6 724.1      Allergic rhinitis  - cetirizine daily as needed    Chronic right knee pain  - stable  - NSAID as needed    Acute right-sided thoracic pain  - NSAID as needed  - return in 2 weeks if with persistent or worsening symptoms      Follow-up Disposition:  Return in about 1 year (around 2019), or as needed, for annual physical.      I have discussed the diagnosis with the patient and the intended plan as seen in the above orders. The patient has received an after-visit summary and questions were answered concerning future plans. I have discussed medication side effects and warnings with the patient as well. I have reviewed the plan of care with the patient, accepted their input and they are in agreement with the treatment goals. Merline Dai MD  2018          This is an Initial Medicare Annual Wellness Exam (AWV) (Performed 12 months after IPPE or effective date of Medicare Part B enrollment, Once in a lifetime)    I have reviewed the patient's medical history in detail and updated the computerized patient record. History     Past Medical History:   Diagnosis Date    Arthritis     B[facility] 1 disorder ([facility] [address]     Psychotic disorder     Bipolar disorder      Past Surgical History:   Procedure Laterality Date    HX  SECTION      x2    HX KNEE ARTHROSCOPY      HX TUBAL LIGATION       Current Outpatient Prescriptions   Medication Sig Dispense Refill    ibuprofen (MOTRIN) 600 mg tablet Take 1 Tab by mouth every six (6) hours as needed for Pain. 100 Tab 2    cetirizine (ZYRTEC) 10 mg tablet Take 1 Tab by mouth nightly. 200 Tab 0    lithium carbonate 300 mg tablet take 1 tablet by mouth every morning and 2 tablets at bedtime  0    risperiDONE (RISPERDAL) 1 mg tablet Take  by mouth daily.  buPROPion XL (WELLBUTRIN XL) 300 mg XL tablet Take 300 mg by mouth every morning.        No Known Allergies  Family History   Problem Relation Age of Onset    Bipolar Disorder Mother     Cancer Father     Diabetes Paternal Grandmother      Social History   Substance Use Topics    Smoking status: Never Smoker    Smokeless tobacco: Never Used    Alcohol use No     Patient Active Problem List   Diagnosis Code    Chronic pain of right knee M25.561, G89.29    Chronic seasonal allergic rhinitis due to pollen J30.1    Lithium use Z79.899    B[facility] disorder ([facility] F31.9       Depression Risk Factor Screening:     PHQ over the last two weeks 3/8/2018   PHQ Not Done -   Little interest or pleasure in doing things Not at all   Feeling down, depressed or hopeless Not at all   Total Score PHQ 2 0     Alcohol Risk Factor Screening: You do not drink alcohol or very rarely. Functional Ability and Level of Safety:     Hearing Loss  Hearing is good. Activities of Daily Living  The home contains: no safety equipment. Patient does total self care    Fall Risk  No flowsheet data found. Abuse Screen  Patient is not abused    Cognitive Screening   Evaluation of Cognitive Function:  Has your family/caregiver stated any concerns about your memory: no  Normal    Patient Care Team   Patient Care Team:  Trang Pepper MD as PCP - General (Internal Medicine)  Phys MD Carlo (Psychiatry)  Phys MD Carlo (Psychiatry)    Assessment/Plan   Education and counseling provided:  Are appropriate based on today's review and evaluation  Screening Mammography    Diagnoses and all orders for this visit:    1. Chronic seasonal allergic rhinitis due to pollen  -     cetirizine (ZYRTEC) 10 mg tablet; Take 1 Tab by mouth nightly. 2. Chronic pain of right knee  -     ibuprofen (MOTRIN) 600 mg tablet; Take 1 Tab by mouth every six (6) hours as needed for Pain. 3. Lithium use  -     CBC WITH AUTOMATED DIFF; Future  -     LIPID PANEL; Future  -     METABOLIC PANEL, COMPREHENSIVE; Future  -     TSH 3RD GENERATION; Future  -     URINALYSIS W/ RFLX MICROSCOPIC; Future    4. Bipolar 1 disorder (HCC)  -     CBC WITH AUTOMATED DIFF; Future  -     LIPID PANEL; Future  -     METABOLIC PANEL, COMPREHENSIVE; Future  -     TSH 3RD GENERATION; Future  -     URINALYSIS W/ RFLX MICROSCOPIC; Future    5. Right-sided chest pain    6. Initial Medicare annual wellness visit    7. Screening for alcoholism  -     Annual  Alcohol Screen 15 min ()    8. Screening for malignant neoplasm of breast  -     GALINA MAMMO BI SCREENING INCL CAD; Future    9.  Acute right-sided thoracic back pain         Health Maintenance Due   Topic Date Due    FOBT Q 1 YEAR AGE 50-75  08/07/2014    PAP AKA CERVICAL CYTOLOGY  03/28/2015    BREAST CANCER SCRN MAMMOGRAM  11/12/2017    MEDICARE YEARLY EXAM  03/30/2018

## 2018-06-07 LAB
ALBUMIN SERPL-MCNC: 4.7 G/DL (ref 3.5–5.5)
ALBUMIN/GLOB SERPL: 2 {RATIO} (ref 1.2–2.2)
ALP SERPL-CCNC: 95 IU/L (ref 39–117)
ALT SERPL-CCNC: 13 IU/L (ref 0–32)
APPEARANCE UR: CLEAR
AST SERPL-CCNC: 16 IU/L (ref 0–40)
BACTERIA #/AREA URNS HPF: ABNORMAL /[HPF]
BASOPHILS # BLD AUTO: 0 X10E3/UL (ref 0–0.2)
BASOPHILS NFR BLD AUTO: 0 %
BILIRUB SERPL-MCNC: 0.2 MG/DL (ref 0–1.2)
BILIRUB UR QL STRIP: NEGATIVE
BUN SERPL-MCNC: 12 MG/DL (ref 6–24)
BUN/CREAT SERPL: 13 (ref 9–23)
CALCIUM SERPL-MCNC: 9.7 MG/DL (ref 8.7–10.2)
CASTS URNS QL MICRO: ABNORMAL /LPF
CHLORIDE SERPL-SCNC: 105 MMOL/L (ref 96–106)
CHOLEST SERPL-MCNC: 163 MG/DL (ref 100–199)
CO2 SERPL-SCNC: 24 MMOL/L (ref 18–29)
COLOR UR: YELLOW
CREAT SERPL-MCNC: 0.9 MG/DL (ref 0.57–1)
EOSINOPHIL # BLD AUTO: 0.1 X10E3/UL (ref 0–0.4)
EOSINOPHIL NFR BLD AUTO: 2 %
EPI CELLS #/AREA URNS HPF: ABNORMAL /HPF
ERYTHROCYTE [DISTWIDTH] IN BLOOD BY AUTOMATED COUNT: 14.4 % (ref 12.3–15.4)
GFR SERPLBLD CREATININE-BSD FMLA CKD-EPI: 73 ML/MIN/1.73
GFR SERPLBLD CREATININE-BSD FMLA CKD-EPI: 84 ML/MIN/1.73
GLOBULIN SER CALC-MCNC: 2.4 G/DL (ref 1.5–4.5)
GLUCOSE SERPL-MCNC: 100 MG/DL (ref 65–99)
GLUCOSE UR QL: NEGATIVE
HCT VFR BLD AUTO: 41.4 % (ref 34–46.6)
HDLC SERPL-MCNC: 46 MG/DL
HGB BLD-MCNC: 13.2 G/DL (ref 11.1–15.9)
HGB UR QL STRIP: NEGATIVE
IMM GRANULOCYTES # BLD: 0 X10E3/UL (ref 0–0.1)
IMM GRANULOCYTES NFR BLD: 0 %
KETONES UR QL STRIP: NEGATIVE
LDLC SERPL CALC-MCNC: 74 MG/DL (ref 0–99)
LEUKOCYTE ESTERASE UR QL STRIP: ABNORMAL
LYMPHOCYTES # BLD AUTO: 2.3 X10E3/UL (ref 0.7–3.1)
LYMPHOCYTES NFR BLD AUTO: 35 %
MCH RBC QN AUTO: 28.6 PG (ref 26.6–33)
MCHC RBC AUTO-ENTMCNC: 31.9 G/DL (ref 31.5–35.7)
MCV RBC AUTO: 90 FL (ref 79–97)
MICRO URNS: ABNORMAL
MONOCYTES # BLD AUTO: 0.5 X10E3/UL (ref 0.1–0.9)
MONOCYTES NFR BLD AUTO: 7 %
MUCOUS THREADS URNS QL MICRO: PRESENT
NEUTROPHILS # BLD AUTO: 3.8 X10E3/UL (ref 1.4–7)
NEUTROPHILS NFR BLD AUTO: 56 %
NITRITE UR QL STRIP: NEGATIVE
PH UR STRIP: 5 [PH] (ref 5–7.5)
PLATELET # BLD AUTO: 317 X10E3/UL (ref 150–379)
POTASSIUM SERPL-SCNC: 4.3 MMOL/L (ref 3.5–5.2)
PROT SERPL-MCNC: 7.1 G/DL (ref 6–8.5)
PROT UR QL STRIP: NEGATIVE
RBC # BLD AUTO: 4.62 X10E6/UL (ref 3.77–5.28)
RBC #/AREA URNS HPF: ABNORMAL /HPF
SODIUM SERPL-SCNC: 143 MMOL/L (ref 134–144)
SP GR UR: 1.02 (ref 1–1.03)
TRIGL SERPL-MCNC: 213 MG/DL (ref 0–149)
TSH SERPL DL<=0.005 MIU/L-ACNC: 3.16 UIU/ML (ref 0.45–4.5)
UROBILINOGEN UR STRIP-MCNC: 0.2 MG/DL (ref 0.2–1)
VLDLC SERPL CALC-MCNC: 43 MG/DL (ref 5–40)
WBC # BLD AUTO: 6.7 X10E3/UL (ref 3.4–10.8)
WBC #/AREA URNS HPF: ABNORMAL /HPF

## 2018-08-22 ENCOUNTER — OFFICE VISIT (OUTPATIENT)
Dept: FAMILY MEDICINE CLINIC | Age: 53
End: 2018-08-22

## 2018-08-22 VITALS
SYSTOLIC BLOOD PRESSURE: 106 MMHG | RESPIRATION RATE: 20 BRPM | OXYGEN SATURATION: 95 % | BODY MASS INDEX: 30.18 KG/M2 | TEMPERATURE: 98.2 F | HEART RATE: 86 BPM | DIASTOLIC BLOOD PRESSURE: 68 MMHG | WEIGHT: 203.8 LBS | HEIGHT: 69 IN

## 2018-08-22 DIAGNOSIS — G89.29 CHRONIC PAIN OF RIGHT KNEE: ICD-10-CM

## 2018-08-22 DIAGNOSIS — M62.838 NECK MUSCLE SPASM: ICD-10-CM

## 2018-08-22 DIAGNOSIS — M54.6 ACUTE RIGHT-SIDED THORACIC BACK PAIN: ICD-10-CM

## 2018-08-22 DIAGNOSIS — J01.10 ACUTE NON-RECURRENT FRONTAL SINUSITIS: Primary | ICD-10-CM

## 2018-08-22 DIAGNOSIS — M25.561 CHRONIC PAIN OF RIGHT KNEE: ICD-10-CM

## 2018-08-22 RX ORDER — CYCLOBENZAPRINE HCL 10 MG
10 TABLET ORAL
Qty: 30 TAB | Refills: 0 | Status: SHIPPED | OUTPATIENT
Start: 2018-08-22 | End: 2019-04-16 | Stop reason: SDUPTHER

## 2018-08-22 RX ORDER — IBUPROFEN 600 MG/1
600 TABLET ORAL
Qty: 100 TAB | Refills: 0 | Status: SHIPPED | OUTPATIENT
Start: 2018-08-22 | End: 2019-04-16 | Stop reason: SDUPTHER

## 2018-08-22 RX ORDER — DICLOFENAC SODIUM 10 MG/G
2 GEL TOPICAL EVERY 6 HOURS
Qty: 100 G | Refills: 0 | Status: SHIPPED | OUTPATIENT
Start: 2018-08-22 | End: 2019-04-16

## 2018-08-22 RX ORDER — CEPHALEXIN 250 MG/5ML
10 POWDER, FOR SUSPENSION ORAL 3 TIMES DAILY
Qty: 200 ML | Refills: 0 | Status: SHIPPED | OUTPATIENT
Start: 2018-08-22 | End: 2018-08-29

## 2018-08-22 NOTE — MR AVS SNAPSHOT
[facility] 
 
 
 [address] [place] [address] [address] [place] [id number] 
[phone] Patient: Karishma Minaya MRN: [id number] [facility]:[birth year] Visit Information Date & Time Provider Department Dept. Phone Encounter #  
 8/22/2018 10:45 AM Jose Gallardo, [facility] [phone] [id number] Follow-up Instructions Return if symptoms worsen or fail to improve. Upcoming Health Maintenance Date Due FOBT Q 1 YEAR AGE 50-75 8/7/2014 PAP AKA CERVICAL CYTOLOGY 3/28/2015 BREAST CANCER SCRN MAMMOGRAM 11/12/2017 Influenza Age 5 to Adult 8/1/2018 MEDICARE YEARLY EXAM 6/7/2019 DTaP/Tdap/Td series (2 - Td) 3/29/2027 Allergies as of 8/22/2018  Review Complete On: 8/22/2018 By: Jose Gallardo MD  
 No Known Allergies Current Immunizations  Never Reviewed Name Date Influenza Vaccine (Quad) PF 10/17/2017 Tdap 3/29/2017 Not reviewed this visit You Were Diagnosed With   
  
 Codes Comments Acute non-recurrent frontal sinusitis    -  Primary ICD-10-CM: J01.10 ICD-9-CM: 221.6 Neck muscle spasm     ICD-10-CM: N59.655 ICD-9-CM: 728.85 Chronic pain of right knee     ICD-10-CM: M25.561, G89.29 ICD-9-CM: 719.46, 338.29 Acute right-sided thoracic back pain     ICD-10-CM: M54.6 ICD-9-CM: 724.1 Vitals BP Pulse Temp Resp Height(growth percentile) Weight(growth percentile) 106/68 86 98.2 °F (36.8 °C) (Oral) 20 5' 9\" (1.753 m) 203 lb 12.8 oz (92.4 kg) SpO2 BMI OB Status Smoking Status 95% 30.1 kg/m2 Menopause Never Smoker BMI and BSA Data Body Mass Index Body Surface Area  
 30.1 kg/m 2 2.12 m 2 Preferred Pharmacy Pharmacy Name Phone [facility]  [facility] [phone] Your Updated Medication List  
  
   
This list is accurate as of 8/22/18 11:05 AM.  Always use your most recent med list.  
  
  
  
  
 buPROPion  mg XL tablet Commonly known as:  [place] Celestine Take 300 mg by mouth every morning. cephALEXin 250 mg/5 mL suspension Commonly known as:  Suzen Wallace Take 10 mL by mouth three (3) times daily for 7 days. cetirizine 10 mg tablet Commonly known as:  ZYRTEC Take 1 Tab by mouth nightly. cyclobenzaprine 10 mg tablet Commonly known as:  FLEXERIL Take 1 Tab by mouth three (3) times daily as needed for Muscle Spasm(s). diclofenac 1 % Gel Commonly known as:  VOLTAREN Apply 2 g to affected area every six (6) hours. ibuprofen 600 mg tablet Commonly known as:  MOTRIN Take 1 Tab by mouth every six (6) hours as needed for Pain.  
  
 lithium carbonate 300 mg tablet  
take 1 tablet by mouth every morning and 2 tablets at bedtime  
  
 risperiDONE 1 mg tablet Commonly known as:  RisperDAL Take  by mouth daily. Prescriptions Sent to Pharmacy Refills  
 cephALEXin (KEFLEX) 250 mg/5 mL suspension 0 Sig: Take 10 mL by mouth three (3) times daily for 7 days. Class: Normal  
 Pharmacy: [address] Ph #: [phone] Route: Oral  
 cyclobenzaprine (FLEXERIL) 10 mg tablet 0 Sig: Take 1 Tab by mouth three (3) times daily as needed for Muscle Spasm(s). Class: Normal  
 Pharmacy: [address] Ph #: [phone] Route: Oral  
 ibuprofen (MOTRIN) 600 mg tablet 0 Sig: Take 1 Tab by mouth every six (6) hours as needed for Pain. Class: Normal  
 Pharmacy: [address] Ph #: [phone] Route: Oral  
 diclofenac (VOLTAREN) 1 % gel 0 Sig: Apply 2 g to affected area every six (6) hours. Class: Normal  
 Pharmacy: [address] Ph #: [phone] Route: Topical  
  
Follow-up Instructions Return if symptoms worsen or fail to improve. Introducing [facility]! Marcy Soaresdom introduces [employer] patient portal. Now you can access parts of your medical record, email your doctor's office, and request medication refills online. 1. In your internet browser, go to [email or web address] 2. Click on the First Time User? Click Here link in the Sign In box. You will see the New Member Sign Up page. 3. Enter your [employer] Access Code exactly as it appears below. You will not need to use this code after youve completed the sign-up process. If you do not sign up before the expiration date, you must request a new code. · [employer] Access Code: [email or web address] Expires: 11/20/2018 10:36 AM 
 
4. Enter the last four digits of your Social Security Number (xxxx) and Date of Birth (mm/dd/yyyy) as indicated and click Submit. You will be taken to the next sign-up page. 5. Create a [employer] ID. This will be your [employer] login ID and cannot be changed, so think of one that is secure and easy to remember. 6. Create a [employer] password. You can change your password at any time. 7. Enter your Password Reset Question and Answer. This can be used at a later time if you forget your password. 8. Enter your e-mail address. You will receive e-mail notification when new information is available in [address] 9. Click Sign Up. You can now view and download portions of your medical record. 10. Click the Download Summary menu link to download a portable copy of your medical information. If you have questions, please visit the Frequently Asked Questions section of the [employer] website. Remember, [employer] is NOT to be used for urgent needs. For medical emergencies, dial 911. Now available from your iPhone and Android! Please provide this summary of care documentation to your next provider. Your primary care clinician is listed as [facility].  If you have any questions after today's visit, please call [phone].

## 2018-08-22 NOTE — PROGRESS NOTES
History of Present Illness  Patty Wagner is a 46 y.o. female who presents today for management of    Chief Complaint   Patient presents with    Sinus Infection    Knee Pain    Shoulder Pain       Upper Respiratory Infection  Patient complains of symptoms of a URI. Symptoms include congestion, coryza and sinus pain. Onset of symptoms was 2 days ago, unchanged since that time. She also c/o head pressure for the past 2 days . She is drinking plenty of fluids. . Evaluation to date: none. Treatment to date: antihistamines. She complains of chronic right knee pain. Pain intensity 6/10, aggravted by walking. No associated swelling. She also complains of posterior neck pain for one week. Problem List  Patient Active Problem List    Diagnosis Date Noted    Lithium use 2018    B[facility] 1 disorder ([facility] [address]     Chronic pain of right knee 10/17/2017    Chronic seasonal allergic rhinitis due to pollen 10/17/2017       Past Medical History  Past Medical History:   Diagnosis Date    Arthritis     B[address] 1 disorder ([address]     Psychotic disorder     Bipolar disorder        Surgical History  Past Surgical History:   Procedure Laterality Date    HX  SECTION      x2    HX KNEE ARTHROSCOPY      HX TUBAL LIGATION          Current Medications  Current Outpatient Prescriptions   Medication Sig    cephALEXin (KEFLEX) 250 mg/5 mL suspension Take 10 mL by mouth three (3) times daily for 7 days.  cyclobenzaprine (FLEXERIL) 10 mg tablet Take 1 Tab by mouth three (3) times daily as needed for Muscle Spasm(s).  ibuprofen (MOTRIN) 600 mg tablet Take 1 Tab by mouth every six (6) hours as needed for Pain.  diclofenac (VOLTAREN) 1 % gel Apply 2 g to affected area every six (6) hours.  cetirizine (ZYRTEC) 10 mg tablet Take 1 Tab by mouth nightly.     lithium carbonate 300 mg tablet take 1 tablet by mouth every morning and 2 tablets at bedtime    risperiDONE (RISPERDAL) 1 mg tablet Take  by mouth daily.    buPROPion XL (WELLBUTRIN XL) 300 mg XL tablet Take 300 mg by mouth every morning. No current facility-administered medications for this visit. Allergies/Drug Reactions  No Known Allergies     Family History  Family History   Problem Relation Age of Onset    Bipolar Disorder Mother     Cancer Father     Diabetes Paternal Grandmother         Social History  Social History     Social History    Marital status:      Spouse name: N/A    Number of children: N/A    Years of education: N/A     Occupational History    Not on file. Social History Main Topics    Smoking status: Never Smoker    Smokeless tobacco: Never Used    Alcohol use No    Drug use: No    Sexual activity: Yes     Partners: Male     Other Topics Concern    Not on file     Social History Narrative       Review of Systems  Negative except as mentioned in HPI      Physical Exam  Vital signs:   Vitals:    08/22/18 1053   BP: 106/68   Pulse: 86   Resp: 20   Temp: 98.2 °F (36.8 °C)   TempSrc: Oral   SpO2: 95%   Weight: 203 lb 12.8 oz (92.4 kg)   Height: 5' 9\" (1.753 m)       General: alert, oriented, not in distress  HEENT:  Head: Normocephalic, (+) frontal sinus tenderness bilaterally  Right Ear: External ear normal. Patent ear canal, TM intact. Left Ear: External ear normal. Patent ear canal, TM intact. Nose: congested turbinates  Mouth/Throat: Oropharynx is clear and moist. No oropharyngeal exudate. Eyes: Conjunctivae and EOM are normal. No scleral icterus. Neck: Normal range of motion. Neck supple. No thyromegaly present. Lymphadenopathy: no cervical adenopathy.    Chest/Lungs: clear breath sounds, no wheezing or crackles  Heart: normal rate, regular rhythm, no murmur  Abdomen: soft, non-distended, non-tender, normal bowel sounds, no organomegaly, no masses  Extremities: no focal deformities, no edema  Neuro: AAOx3, CN's grossly intact  Skin: no visible abnormalities      Assessment/Plan:      ICD-10-CM ICD-9-CM    1. Acute non-recurrent frontal sinusitis J01.10 461.1 cephALEXin (KEFLEX) 250 mg/5 mL suspension   2. Neck muscle spasm M62.838 728.85 cyclobenzaprine (FLEXERIL) 10 mg tablet   3. Chronic pain of right knee M25.561 719.46 ibuprofen (MOTRIN) 600 mg tablet    G89.29 338.29    4. Acute right-sided thoracic back pain M54.6 724.1 diclofenac (VOLTAREN) 1 % gel           Follow-up Disposition:  Return if symptoms worsen or fail to improve. I have discussed the diagnosis with the patient and the intended plan as seen in the above orders. The patient has received an after-visit summary and questions were answered concerning future plans. I have discussed medication side effects and warnings with the patient as well. I have reviewed the plan of care with the patient, accepted their input and they are in agreement with the treatment goals.        Yevgeniy Perez MD  August 22, 2018

## 2018-08-23 ENCOUNTER — TELEPHONE (OUTPATIENT)
Dept: FAMILY MEDICINE CLINIC | Age: 53
End: 2018-08-23

## 2018-08-23 DIAGNOSIS — J01.10 ACUTE NON-RECURRENT FRONTAL SINUSITIS: Primary | ICD-10-CM

## 2018-08-23 RX ORDER — AZITHROMYCIN 250 MG/1
TABLET, FILM COATED ORAL
Qty: 6 TAB | Refills: 0 | Status: SHIPPED | OUTPATIENT
Start: 2018-08-23 | End: 2019-04-16 | Stop reason: ALTCHOICE

## 2018-08-23 NOTE — TELEPHONE ENCOUNTER
Patient call requesting Romy call Z-pack into pharmacy. She stated she has taken it enough to know what works. She can not stand the syrup that was prescribed for her and it's not working.

## 2018-08-23 NOTE — TELEPHONE ENCOUNTER
Medication Request has already been taking care of By Dr. Tong Seen today. As requested she sent     azithromycin (ZITHROMAX) 250 mg tablet [[id number]]   Order Details   Dose, Route, Frequency: As Directed    Dispense Quantity:  6 Tab Refills:  0 Fills Remaining:  --           Si tabs today, then 1 tab for 4 more days.          Written Date:  18 Expiration Date:  --     Start Date:  18 End Date:  --          To the listed pharmacy. No other action required at this time.

## 2018-08-23 NOTE — TELEPHONE ENCOUNTER
Patient called in request change in medication. States Abx ordered yesterday does not seem to be working.  Requesting a Iesha So

## 2018-08-23 NOTE — TELEPHONE ENCOUNTER
Sent Zpack to pharmacy. Advise to stop cephalexin. Follow-up after one week if no improvement of symptoms.

## 2018-11-21 ENCOUNTER — OFFICE VISIT (OUTPATIENT)
Dept: INTERNAL MEDICINE CLINIC | Age: 53
End: 2018-11-21

## 2018-11-21 VITALS
WEIGHT: 191 LBS | SYSTOLIC BLOOD PRESSURE: 123 MMHG | TEMPERATURE: 98.7 F | RESPIRATION RATE: 15 BRPM | BODY MASS INDEX: 28.29 KG/M2 | DIASTOLIC BLOOD PRESSURE: 82 MMHG | OXYGEN SATURATION: 94 % | HEART RATE: 95 BPM | HEIGHT: 69 IN

## 2018-11-21 DIAGNOSIS — J06.9 VIRAL URI WITH COUGH: ICD-10-CM

## 2018-11-21 DIAGNOSIS — R09.82 PND (POST-NASAL DRIP): Primary | ICD-10-CM

## 2018-11-21 RX ORDER — FLUTICASONE PROPIONATE 50 MCG
2 SPRAY, SUSPENSION (ML) NASAL DAILY
Qty: 1 BOTTLE | Refills: 2 | Status: SHIPPED | OUTPATIENT
Start: 2018-11-21 | End: 2019-04-16

## 2018-11-21 RX ORDER — CETIRIZINE HYDROCHLORIDE, PSEUDOEPHEDRINE HYDROCHLORIDE 5; 120 MG/1; MG/1
1 TABLET, FILM COATED, EXTENDED RELEASE ORAL 2 TIMES DAILY
Qty: 24 TAB | Refills: 3 | Status: SHIPPED | OUTPATIENT
Start: 2018-11-21 | End: 2019-04-16

## 2018-11-21 NOTE — PROGRESS NOTES
FAMILY MEDICINE CLINIC NOTE 
 
S: Cough productive of yellowish sputum which is worse at night for the last 4 days. Associated with rhinorrhea. No fever. 1 sick contact. No ear pain or sinus pain. The patient received her flu shot this season. She has tried mucinex and darrel seltzer without relief. Current Outpatient Medications on File Prior to Visit Medication Sig Dispense Refill  azithromycin (ZITHROMAX) 250 mg tablet 2 tabs today, then 1 tab for 4 more days. 6 Tab 0  cyclobenzaprine (FLEXERIL) 10 mg tablet Take 1 Tab by mouth three (3) times daily as needed for Muscle Spasm(s). 30 Tab 0  ibuprofen (MOTRIN) 600 mg tablet Take 1 Tab by mouth every six (6) hours as needed for Pain. 100 Tab 0  
 lithium carbonate 300 mg tablet take 1 tablet by mouth every morning and 2 tablets at bedtime  0  
 risperiDONE (RISPERDAL) 1 mg tablet Take  by mouth daily.  buPROPion XL (WELLBUTRIN XL) 300 mg XL tablet Take 300 mg by mouth every morning.  diclofenac (VOLTAREN) 1 % gel Apply 2 g to affected area every six (6) hours. 100 g 0 No current facility-administered medications on file prior to visit. Past Medical History:  
Diagnosis Date  Arthritis  Bipolar 1 disorder ([facility]  Psychotic disorder ([facility] Bipolar disorder Social History Socioeconomic History  Marital status:  Spouse name: Not on file  Number of children: Not on file  Years of education: Not on file  Highest education level: Not on file Social Needs  Financial resource strain: Not on file  Food insecurity - worry: Not on file  Food insecurity - inability: Not on file  Transportation needs - medical: Not on file  Transportation needs - non-medical: Not on file Occupational History  Not on file Tobacco Use  Smoking status: Never Smoker  Smokeless tobacco: Never Used Substance and Sexual Activity  Alcohol use: No  
 Drug use:  No  
  Sexual activity: Yes  
  Partners: Male Other Topics Concern  Not on file Social History Narrative  Not on file Family History Problem Relation Age of Onset  Bipolar Disorder Mother  Cancer Father  Diabetes Paternal Grandmother O: 
Visit Vitals /82 (BP 1 Location: Left arm, BP Patient Position: Sitting) Pulse 95 Temp 98.7 °F (37.1 °C) (Oral) Resp 15 Ht 5' 9\" (1.753 m) Wt 191 lb (86.6 kg) SpO2 94% BMI 28.21 kg/m² NAD, comfortable No LAD Erythema of the posterior oropharynx Bulging TM bilat RRR, no murmurs CTABL, no wheezing/ronchi/rales 46 y.o. female ICD-10-CM ICD-9-CM 1. PND (post-nasal drip) R09.82 784.91 cetirizine-psuedoePHEDrine (ZYRTEC-D) 5-120 mg per tablet  
   fluticasone (FLONASE) 50 mcg/actuation nasal spray 2. Viral URI with cough J06.9 465.9 cetirizine-psuedoePHEDrine (ZYRTEC-D) 5-120 mg per tablet  
 B97.89  fluticasone (FLONASE) 50 mcg/actuation nasal spray

## 2018-11-21 NOTE — PROGRESS NOTES
ROOM # 9 Lizbeth Adkins presents today for Chief Complaint Patient presents with  Cough Lizbeth Adkins preferred language for health care discussion is english/other. Is someone accompanying this pt? no 
 
Is the patient using any DME equipment during OV? no 
 
Depression Screening: PHQ over the last two weeks 11/21/2018 3/8/2018 10/17/2017 3/29/2017 2/16/2017 7/5/2016 PHQ Not Done - - - Active Diagnosis of Depression or Bipolar Disorder Active Diagnosis of Depression or Bipolar Disorder Active Diagnosis of Depression or Bipolar Disorder Little interest or pleasure in doing things Not at all Not at all Not at all - - - Feeling down, depressed, irritable, or hopeless Not at all Not at all Not at all - - - Total Score PHQ 2 0 0 0 - - - Learning Assessment: 
Learning Assessment 11/21/2018 7/5/2016 PRIMARY LEARNER Patient Patient HIGHEST LEVEL OF EDUCATION - PRIMARY LEARNER  SOME COLLEGE 2 YEARS OF COLLEGE  
BARRIERS PRIMARY LEARNER OTHER NONE PRIMARY LANGUAGE ENGLISH ENGLISH  
LEARNER PREFERENCE PRIMARY READING VIDEOS  
ANSWERED BY patient patient RELATIONSHIP SELF SELF Abuse Screening: 
Abuse Screening Questionnaire 7/5/2016 Do you ever feel afraid of your partner? Brennan Hora Are you in a relationship with someone who physically or mentally threatens you? Brennan Hora Is it safe for you to go home? Akil Led Fall Risk No flowsheet data found. Health Maintenance reviewed and discussed per provider. Yes Lizbeth Adkins is due for Health Maintenance Due Topic Date Due  Shingrix Vaccine Age 50> (1 of 2) 08/07/2014  
 FOBT Q 1 YEAR AGE 50-75  08/07/2014  PAP AKA CERVICAL CYTOLOGY  03/28/2015  BREAST CANCER SCRN MAMMOGRAM  11/12/2017  Influenza Age 5 to Adult  08/01/2018 Please order/place referral if appropriate. Advance Directive: 1. Do you have an advance directive in place?  Patient Reply: no 
 
 2. If not, would you like material regarding how to put one in place? Patient Reply: no 
 
Coordination of Care: 1. Have you been to the ER, urgent care clinic since your last visit? Hospitalized since your last visit? no 
 
2. Have you seen or consulted any other health care providers outside of the [address] [place] [address] since your last visit? Include any pap smears or colon screening.  no

## 2018-11-23 ENCOUNTER — TELEPHONE (OUTPATIENT)
Dept: FAMILY MEDICINE CLINIC | Age: 53
End: 2018-11-23

## 2018-11-23 NOTE — TELEPHONE ENCOUNTER
Patient stated she went to [facility] location for a severe cough. She stated they prescribed her medication for nasal infection. She is requesting medication for cough medicine prescribed to her. Please advise, thank you.

## 2018-11-23 NOTE — TELEPHONE ENCOUNTER
Nurse advised patient that provider is not in office,and to try OTC cough medication. Patient stated she will call back on Mon.

## 2019-04-16 ENCOUNTER — OFFICE VISIT (OUTPATIENT)
Dept: FAMILY MEDICINE CLINIC | Age: 54
End: 2019-04-16

## 2019-04-16 VITALS
TEMPERATURE: 97.1 F | WEIGHT: 200.2 LBS | DIASTOLIC BLOOD PRESSURE: 57 MMHG | HEIGHT: 69 IN | SYSTOLIC BLOOD PRESSURE: 110 MMHG | HEART RATE: 91 BPM | BODY MASS INDEX: 29.65 KG/M2 | OXYGEN SATURATION: 98 % | RESPIRATION RATE: 18 BRPM

## 2019-04-16 DIAGNOSIS — Z13.6 ENCOUNTER FOR LIPID SCREENING FOR CARDIOVASCULAR DISEASE: ICD-10-CM

## 2019-04-16 DIAGNOSIS — F31.9 BIPOLAR 1 DISORDER (HCC): ICD-10-CM

## 2019-04-16 DIAGNOSIS — J30.1 CHRONIC SEASONAL ALLERGIC RHINITIS DUE TO POLLEN: ICD-10-CM

## 2019-04-16 DIAGNOSIS — Z13.39 SCREENING FOR ALCOHOLISM: ICD-10-CM

## 2019-04-16 DIAGNOSIS — M62.838 NECK MUSCLE SPASM: ICD-10-CM

## 2019-04-16 DIAGNOSIS — Z12.39 SCREENING FOR MALIGNANT NEOPLASM OF BREAST: Primary | ICD-10-CM

## 2019-04-16 DIAGNOSIS — M25.561 CHRONIC PAIN OF RIGHT KNEE: ICD-10-CM

## 2019-04-16 DIAGNOSIS — Z13.220 ENCOUNTER FOR LIPID SCREENING FOR CARDIOVASCULAR DISEASE: ICD-10-CM

## 2019-04-16 DIAGNOSIS — Z00.00 INITIAL MEDICARE ANNUAL WELLNESS VISIT: ICD-10-CM

## 2019-04-16 DIAGNOSIS — Z79.899 LITHIUM USE: ICD-10-CM

## 2019-04-16 DIAGNOSIS — G89.29 CHRONIC PAIN OF RIGHT KNEE: ICD-10-CM

## 2019-04-16 RX ORDER — CYCLOBENZAPRINE HCL 10 MG
10 TABLET ORAL
Qty: 30 TAB | Refills: 2 | Status: SHIPPED | OUTPATIENT
Start: 2019-04-16 | End: 2020-09-15

## 2019-04-16 RX ORDER — IBUPROFEN 600 MG/1
600 TABLET ORAL
Qty: 100 TAB | Refills: 0 | Status: SHIPPED | OUTPATIENT
Start: 2019-04-16 | End: 2020-09-15 | Stop reason: SDUPTHER

## 2019-04-16 RX ORDER — CETIRIZINE HCL 10 MG
TABLET ORAL
COMMUNITY
End: 2019-04-16 | Stop reason: SDUPTHER

## 2019-04-16 RX ORDER — CETIRIZINE HCL 10 MG
10 TABLET ORAL
Qty: 90 TAB | Refills: 2 | Status: SHIPPED | OUTPATIENT
Start: 2019-04-16 | End: 2021-04-27 | Stop reason: SDUPTHER

## 2019-04-16 NOTE — PROGRESS NOTES
This is an Initial Medicare Annual Wellness Exam (AWV) (Performed 12 months after IPPE or effective date of Medicare Part B enrollment, Once in a lifetime) I have reviewed the patient's medical history in detail and updated the computerized patient record. History Allergic Rhinitis Patient presents for evaluation of allergic symptoms. Symptoms include nasal congestion, rhinorrhea, sneezing, eye itching, watery eyes and are not present in a seasonal pattern. Precipitants include pollen, perfumes, smoke. Treatment in the past has included oral antihistamines: cetirizine, Vicks nose spray. Treatment currently includes intranasal antihistamines: cetirizine and is effective in the patient's opinion. Past Medical History:  
Diagnosis Date  Arthritis  Bipolar 1 disorder ([address]  Psychotic disorder ([facility] [address] Bipolar disorder Past Surgical History:  
Procedure Laterality Date  HX  SECTION    
 x2  
 HX KNEE ARTHROSCOPY    HX TUBAL LIGATION Current Outpatient Medications Medication Sig Dispense Refill  cyclobenzaprine (FLEXERIL) 10 mg tablet Take 1 Tab by mouth three (3) times daily as needed for Muscle Spasm(s). 30 Tab 2  ibuprofen (MOTRIN) 600 mg tablet Take 1 Tab by mouth every six (6) hours as needed for Pain. 100 Tab 0  
 cetirizine (ZYRTEC) 10 mg tablet Take 1 Tab by mouth daily as needed for Allergies. 90 Tab 2  
 lithium carbonate 300 mg tablet take 1 tablet by mouth every morning and 2 tablets at bedtime  0  
 risperiDONE (RISPERDAL) 1 mg tablet Take  by mouth daily.  buPROPion XL (WELLBUTRIN XL) 300 mg XL tablet Take 300 mg by mouth every morning. Allergies Allergen Reactions  Adhesive Unknown (comments) Plastic  Tape takes skin off  Pseudoephedrine Hcl Unknown (comments) Very dry sinuses Family History Problem Relation Age of Onset  Bipolar Disorder Mother  Cancer Father  Diabetes Paternal Grandmother Social History Tobacco Use  Smoking status: Never Smoker  Smokeless tobacco: Never Used Substance Use Topics  Alcohol use: No  
 
Patient Active Problem List  
Diagnosis Code  Chronic pain of right knee M25.561, G89.29  Chronic seasonal allergic rhinitis due to pollen J30.1  Lithium use Z79.899  Bipolar 1 disorder (HCC) F31.9 Depression Risk Factor Screening:  
 
3 most recent PHQ Screens 11/21/2018 PHQ Not Done - Little interest or pleasure in doing things Not at all Feeling down, depressed, irritable, or hopeless Not at all Total Score PHQ 2 0 Alcohol Risk Factor Screening: You do not drink alcohol or very rarely. Functional Ability and Level of Safety:  
 
Hearing Loss Hearing is good. Activities of Daily Living The home contains: no safety equipment. Patient does total self care Fall Risk Fall Risk Assessment, last 12 mths 4/16/2019 Able to walk? Yes Fall in past 12 months? No  
 
 
Abuse Screen Patient is not abused Cognitive Screening Evaluation of Cognitive Function: 
Has your family/caregiver stated any concerns about your memory: no 
Normal 
 
Patient Care Team  
Patient Care Team: 
Jackelin Rashid MD as PCP - General (Internal Medicine) Other, MD Daphnie (Psychiatry) Daphnie Matos MD (Psychiatry) Assessment/Plan Education and counseling provided: 
Are appropriate based on today's review and evaluation Diagnoses and all orders for this visit: 1. Screening for malignant neoplasm of breast 
-     GALINA MAMMO BI SCREENING INCL CAD; Future 2. Initial Medicare annual wellness visit 3. Screening for alcoholism -     CO ANNUAL ALCOHOL SCREEN 15 MIN 4. Neck muscle spasm 
-     cyclobenzaprine (FLEXERIL) 10 mg tablet; Take 1 Tab by mouth three (3) times daily as needed for Muscle Spasm(s). 5. Chronic pain of right knee -     ibuprofen (MOTRIN) 600 mg tablet; Take 1 Tab by mouth every six (6) hours as needed for Pain. 6. Bipolar 1 disorder (HCC) 
-     CBC WITH AUTOMATED DIFF; Future -     METABOLIC PANEL, COMPREHENSIVE; Future -     URINALYSIS W/ RFLX MICROSCOPIC; Future 7. Lithium use 
-     TSH 3RD GENERATION; Future 8. Encounter for lipid screening for cardiovascular disease -     LIPID PANEL; Future 9. Chronic seasonal allergic rhinitis due to pollen 
-     cetirizine (ZYRTEC) 10 mg tablet; Take 1 Tab by mouth daily as needed for Allergies. Health Maintenance Due Topic Date Due  Shingrix Vaccine Age 50> (1 of 2) 08/07/2014  PAP AKA CERVICAL CYTOLOGY  03/28/2015  BREAST CANCER SCRN MAMMOGRAM  11/12/2017

## 2019-04-16 NOTE — PROGRESS NOTES
1. Have you been to the ER, urgent care clinic since your last visit? Hospitalized since your last visit? No 
 
2. Have you seen or consulted any other health care providers outside of the [address] [place] [address] since your last visit? Include any pap smears or colon screening.  No

## 2019-04-16 NOTE — PATIENT INSTRUCTIONS
Medicare Wellness Visit, Female The best way to live healthy is to have a lifestyle where you eat a well-balanced diet, exercise regularly, limit alcohol use, and quit all forms of tobacco/nicotine, if applicable. Regular preventive services are another way to keep healthy. Preventive services (vaccines, screening tests, monitoring & exams) can help personalize your care plan, which helps you manage your own care. Screening tests can find health problems at the earliest stages, when they are easiest to treat. [facility] follows the current, evidence-based guidelines published by the [facility] ([facility]) when recommending preventive services for our patients. Because we follow these guidelines, sometimes recommendations change over time as research supports it. (For example, mammograms used to be recommended annually. Even though Medicare will still pay for an annual mammogram, the newer guidelines recommend a mammogram every two years for women of average risk.) Of course, you and your doctor may decide to screen more often for some diseases, based on your risk and your health status. Preventive services for you include: - Medicare offers their members a free annual wellness visit, which is time for you and your primary care provider to discuss and plan for your preventive service needs. Take advantage of this benefit every year! 
-All adults over the age of 72 should receive the recommended pneumonia vaccines. Current USPSTF guidelines recommend a series of two vaccines for the best pneumonia protection.  
-All adults should have a flu vaccine yearly and a tetanus vaccine every 10 years. All adults age 61 and older should receive a shingles vaccine once in their lifetime.   
-A bone mass density test is recommended when a woman turns 65 to screen for osteoporosis. This test is only recommended one time, as a screening. Some providers will use this same test as a disease monitoring tool if you already have osteoporosis. -All adults age 38-68 who are overweight should have a diabetes screening test once every three years.  
-Other screening tests and preventive services for persons with diabetes include: an eye exam to screen for diabetic retinopathy, a kidney function test, a foot exam, and stricter control over your cholesterol.  
-Cardiovascular screening for adults with routine risk involves an electrocardiogram (ECG) at intervals determined by your doctor.  
-Colorectal cancer screenings should be done for adults age 54-65 with no increased risk factors for colorectal cancer. There are a number of acceptable methods of screening for this type of cancer. Each test has its own benefits and drawbacks. Discuss with your doctor what is most appropriate for you during your annual wellness visit. The different tests include: colonoscopy (considered the best screening method), a fecal occult blood test, a fecal DNA test, and sigmoidoscopy. -Breast cancer screenings are recommended every other year for women of normal risk, age 54-69. 
-Cervical cancer screenings for women over age 72 are only recommended with certain risk factors.  
-All adults born between [facility] should be screened once for Hepatitis C. Here is a list of your current Health Maintenance items (your personalized list of preventive services) with a due date: 
Health Maintenance Due Topic Date Due  Shingles Vaccine (1 of 2) 08/07/2014  Stool testing for trace blood  08/07/2014  Pap Test  03/28/2015  Mammogram  11/12/2017

## 2019-05-22 ENCOUNTER — DOCUMENTATION ONLY (OUTPATIENT)
Dept: FAMILY MEDICINE CLINIC | Age: 54
End: 2019-05-22

## 2019-05-22 NOTE — PROGRESS NOTES
Patient did not arrive to their scheduled appointment on 5/21/19. No show letter #1 sent on 05/22/19. Thank you.

## 2019-12-12 ENCOUNTER — PATIENT OUTREACH (OUTPATIENT)
Dept: CASE MANAGEMENT | Age: 54
End: 2019-12-12

## 2019-12-17 ENCOUNTER — PATIENT OUTREACH (OUTPATIENT)
Dept: CASE MANAGEMENT | Age: 54
End: 2019-12-17

## 2020-07-09 ENCOUNTER — VIRTUAL VISIT (OUTPATIENT)
Dept: FAMILY MEDICINE CLINIC | Age: 55
End: 2020-07-09

## 2020-07-09 DIAGNOSIS — J01.90 ACUTE RHINOSINUSITIS: Primary | ICD-10-CM

## 2020-07-09 DIAGNOSIS — F31.9 BIPOLAR 1 DISORDER (HCC): ICD-10-CM

## 2020-07-09 PROBLEM — H40.9 GLAUCOMA: Status: ACTIVE | Noted: 2020-07-09

## 2020-07-09 RX ORDER — AZITHROMYCIN 250 MG/1
TABLET, FILM COATED ORAL
Qty: 6 TAB | Refills: 0 | Status: SHIPPED | OUTPATIENT
Start: 2020-07-09 | End: 2020-09-15 | Stop reason: SDUPTHER

## 2020-07-09 NOTE — PROGRESS NOTES
Caitlin Abad is a 53 y.o. female who was seen by synchronous (real-time) audio-video technology using doxy. me on 7/9/2020. Location of the patient: Home    Location of the provider: [facility]    Consent:  She and/or health care decision maker is aware that that she may receive a bill for this telehealth service, depending on her insurance coverage, and has provided verbal consent to proceed: Yes    Subjective:   Caitlin Abad is a 53 y.o. female who presents today for management of    Chief Complaint   Patient presents with    Sinus Infection       Sinus Pain  Patient complains of congestion and sinus pressure. Symptoms include achiness, coryza, facial pain and dizziness with no fever, chills, or night sweats. Onset of symptoms was 5 days ago, gradually improving since that time. She is drinking plenty of fluids. .  Past history is significant for no history of pneumonia or bronchitis. Patient is non-smoker    Patient complains of feeling depressed. She is in the middle of divorce. Problem List  Patient Active Problem List    Diagnosis Date Noted    Glaucoma 07/09/2020    Lithium use 06/06/2018    Bipolar 1 disorder (HCC)     Chronic pain of right knee 10/17/2017    Chronic seasonal allergic rhinitis due to pollen 10/17/2017       Current Medications  Current Outpatient Medications   Medication Sig    azithromycin (ZITHROMAX) 250 mg tablet 2 tabs today, then 1 tab for 4 more days.  cyclobenzaprine (FLEXERIL) 10 mg tablet Take 1 Tab by mouth three (3) times daily as needed for Muscle Spasm(s).  ibuprofen (MOTRIN) 600 mg tablet Take 1 Tab by mouth every six (6) hours as needed for Pain.  cetirizine (ZYRTEC) 10 mg tablet Take 1 Tab by mouth daily as needed for Allergies.  lithium carbonate 300 mg tablet take 1 tablet by mouth every morning and 2 tablets at bedtime    risperiDONE (RISPERDAL) 1 mg tablet Take  by mouth daily.     buPROPion XL (WELLBUTRIN XL) 300 mg XL tablet Take 300 mg by mouth every morning. No current facility-administered medications for this visit. Allergies/Drug Reactions  Allergies   Allergen Reactions    Adhesive Unknown (comments)     Plastic  Tape takes skin off    Pseudoephedrine Hcl Unknown (comments)     Very dry sinuses        Social History  Social History     Tobacco Use    Smoking status: Never Smoker    Smokeless tobacco: Never Used   Substance Use Topics    Alcohol use: No    Drug use: No        Review of Systems  Review of Systems   Constitutional: Positive for malaise/fatigue. Negative for chills, fever and weight loss. HENT: Positive for congestion and sinus pain. Negative for ear discharge, ear pain, hearing loss, sore throat and tinnitus. Respiratory: Negative. Negative for stridor. Cardiovascular: Negative. Gastrointestinal: Negative. Genitourinary: Negative. Musculoskeletal: Negative. Skin: Negative for itching and rash. Neurological: Positive for dizziness. Psychiatric/Behavioral: Positive for depression. Negative for hallucinations, substance abuse and suicidal ideas. The patient is not nervous/anxious. Objective:     General: alert, cooperative, no distress   Mental  status: mental status: alert, oriented to person, place, and time, normal mood, behavior, speech, dress, motor activity, and thought processes   Resp: resp: normal effort and no respiratory distress   Neuro: neuro: no gross deficits   Skin: skin: no discoloration or lesions of concern on visible areas     Due to this being a TeleHealth evaluation, many elements of the physical examination are unable to be assessed. Assessment & Plan:   1. Acute rhinosinusitis  - saline rinse  - azithromycin (ZITHROMAX) 250 mg tablet; 2 tabs today, then 1 tab for 4 more days. Dispense: 6 Tab; Refill: 0    2.  [facility]  - currently with depression  - psychiatry follow-up  - Counseled on:  · Adequate sleep  · Eating healthy foods. Try to avoid eating only foods that give comfort. Ask someone to join you for a meal if you do not like eating alone. Consider taking a multivitamin every day. · Get some exercise every day. Even a walk can help. Other exercises, such as yoga, can also help manage stress. · Comfort yourself. Take time to look at photos or use special items that make you feel better. · Stay involved in your life. Do not withdraw from the activities you enjoy. People you know at work, [employer], clubs, or other groups can help you get through your period of depression. Follow-up and Dispositions    · Return in about 3 months (around 10/9/2020) for medicare wellness visit. We discussed the expected course, resolution and complications of the diagnosis(es) in detail. Medication risks, benefits, costs, interactions, and alternatives were discussed as indicated. I advised her to contact the office if her condition worsens, changes or fails to improve as anticipated. She expressed understanding with the diagnosis(es) and plan. Pursuant to the emergency declaration under the [facility] waiver authority and the [employer] Act, this Virtual  Visit was conducted, with patient's consent, to reduce the patient's risk of exposure to COVID-19 and provide continuity of care for an established patient. Services were provided through a video synchronous discussion virtually to substitute for in-person clinic visit.     Carlos Manuel Rodrigez MD

## 2020-09-15 ENCOUNTER — VIRTUAL VISIT (OUTPATIENT)
Dept: FAMILY MEDICINE CLINIC | Age: 55
End: 2020-09-15

## 2020-09-15 DIAGNOSIS — G89.29 CHRONIC PAIN OF RIGHT KNEE: ICD-10-CM

## 2020-09-15 DIAGNOSIS — F31.9 BIPOLAR 1 DISORDER (HCC): Primary | ICD-10-CM

## 2020-09-15 DIAGNOSIS — J01.90 ACUTE RHINOSINUSITIS: ICD-10-CM

## 2020-09-15 DIAGNOSIS — M25.561 CHRONIC PAIN OF RIGHT KNEE: ICD-10-CM

## 2020-09-15 DIAGNOSIS — Z13.39 SCREENING FOR ALCOHOLISM: ICD-10-CM

## 2020-09-15 DIAGNOSIS — Z00.00 MEDICARE ANNUAL WELLNESS VISIT, SUBSEQUENT: ICD-10-CM

## 2020-09-15 DIAGNOSIS — M62.838 NECK MUSCLE SPASM: ICD-10-CM

## 2020-09-15 RX ORDER — AZITHROMYCIN 250 MG/1
TABLET, FILM COATED ORAL
Qty: 6 TAB | Refills: 0 | Status: SHIPPED | OUTPATIENT
Start: 2020-09-15 | End: 2021-01-14

## 2020-09-15 RX ORDER — IBUPROFEN 600 MG/1
600 TABLET ORAL
Qty: 100 TAB | Refills: 0 | Status: SHIPPED | OUTPATIENT
Start: 2020-09-15

## 2020-09-15 RX ORDER — CYCLOBENZAPRINE HCL 10 MG
10 TABLET ORAL
Qty: 30 TAB | Refills: 2 | Status: SHIPPED | OUTPATIENT
Start: 2020-09-15 | End: 2021-04-27 | Stop reason: SDUPTHER

## 2020-09-15 NOTE — ADDENDUM NOTE
Addended by: Margret Wong on: 9/15/2020 09:20 AM     Modules accepted: Orders, Level of Service, SmartSet

## 2020-09-15 NOTE — PROGRESS NOTES
Susan Li is a 63 y.o. female who was seen by synchronous (real-time) audio-video technology using doxy. me on 9/15/2020. Location of the patient: Home    Location of the provider: [facility]    Consent:  She and/or health care decision maker is aware that that she may receive a bill for this telehealth service, depending on her insurance coverage, and has provided verbal consent to proceed: Yes    Subjective:   Rani Bowden is a 63 y.o. female who presents today for management of    Chief Complaint   Patient presents with    Sinus Pain       Sinus Pain  Patient complains of facial pain. Symptoms include coryza, nasal congestion and post nasal drip with no fever, chills, or night sweats. Onset of symptoms was 1 week ago, unchanged since that time. She is drinking plenty of fluids. .  Past history is significant for allergic rhinitis. Patient is non-smoker    Problem List  Patient Active Problem List    Diagnosis Date Noted    Glaucoma 07/09/2020    Lithium use 06/06/2018    Bipolar 1 disorder (HCC)     Chronic pain of right knee 10/17/2017    Chronic seasonal allergic rhinitis due to pollen 10/17/2017       Current Medications  Current Outpatient Medications   Medication Sig    azithromycin (ZITHROMAX) 250 mg tablet 2 tabs today, then 1 tab for 4 more days.  cyclobenzaprine (FLEXERIL) 10 mg tablet Take 1 Tab by mouth three (3) times daily as needed for Muscle Spasm(s).  ibuprofen (MOTRIN) 600 mg tablet Take 1 Tab by mouth every six (6) hours as needed for Pain.  cetirizine (ZYRTEC) 10 mg tablet Take 1 Tab by mouth daily as needed for Allergies.  lithium carbonate 300 mg tablet take 1 tablet by mouth every morning and 2 tablets at bedtime    risperiDONE (RISPERDAL) 1 mg tablet Take  by mouth daily.  buPROPion XL (WELLBUTRIN XL) 300 mg XL tablet Take 300 mg by mouth every morning. No current facility-administered medications for this visit.         Allergies/Drug Reactions  Allergies   Allergen Reactions    Adhesive Unknown (comments)     Plastic  Tape takes skin off    Pseudoephedrine Hcl Unknown (comments)     Very dry sinuses        Social History  Social History     Tobacco Use    Smoking status: Never Smoker    Smokeless tobacco: Never Used   Substance Use Topics    Alcohol use: No    Drug use: No        Review of Systems  Review of Systems   Constitutional: Negative for chills, fever, malaise/fatigue and weight loss. HENT: Positive for congestion and sinus pain. Eyes: Negative for blurred vision. Respiratory: Negative for cough, sputum production, shortness of breath and wheezing. Cardiovascular: Negative for chest pain, palpitations and leg swelling. Gastrointestinal: Negative. Genitourinary: Negative. Musculoskeletal: Negative. Neurological: Positive for headaches. Negative for dizziness. Objective:     General: alert, cooperative, no distress   Mental  status: mental status: alert, oriented to person, place, and time, normal mood, behavior, speech, dress, motor activity, and thought processes   Resp: resp: normal effort and no respiratory distress   Neuro: neuro: no gross deficits   Skin: skin: no discoloration or lesions of concern on visible areas     Due to this being a TeleHealth evaluation, many elements of the physical examination are unable to be assessed. Assessment & Plan:   1. Acute rhinosinusitis  - ok to use OTC decongestants  - azithromycin (ZITHROMAX) 250 mg tablet; 2 tabs today, then 1 tab for 4 more days. Dispense: 6 Tab; Refill: 0    2. Bipolar 1 disorder ([facility]  - stable  - psych follow-up    3. Neck muscle spasm  - cyclobenzaprine (FLEXERIL) 10 mg tablet; Take 1 Tab by mouth three (3) times daily as needed for Muscle Spasm(s). Dispense: 30 Tab; Refill: 2  - NSAID as needed    Follow-up and Dispositions    · Return if symptoms worsen or fail to improve.          We discussed the expected course, resolution and complications of the diagnosis(es) in detail. Medication risks, benefits, costs, interactions, and alternatives were discussed as indicated. I advised her to contact the office if her condition worsens, changes or fails to improve as anticipated. She expressed understanding with the diagnosis(es) and plan. Pursuant to the emergency declaration under the [address] [place] [address] [facility] authority and the [employer] and [employer] Act, this Virtual  Visit was conducted, with patient's consent, to reduce the patient's risk of exposure to COVID-19 and provide continuity of care for an established patient. Services were provided through a video synchronous discussion virtually to substitute for in-person clinic visit. Grzegorz Taylor MD     This is the Subsequent Medicare Annual Wellness Exam, performed 12 months or more after the Initial AWV or the last Subsequent AWV    I have reviewed the patient's medical history in detail and updated the computerized patient record. History     Patient Active Problem List   Diagnosis Code    Chronic pain of right knee M25.561, G89.29    Chronic seasonal allergic rhinitis due to pollen J30.1    Lithium use Z79.899    Bipolar 1 disorder ([address] F31.9    Glaucoma H40.9     Past Medical History:   Diagnosis Date    Arthritis     Bipolar 1 disorder ([address]     Psychotic disorder ([address]     Bipolar disorder      Past Surgical History:   Procedure Laterality Date    HX  SECTION      x2    HX KNEE ARTHROSCOPY      HX TUBAL LIGATION       Current Outpatient Medications   Medication Sig Dispense Refill    azithromycin (ZITHROMAX) 250 mg tablet 2 tabs today, then 1 tab for 4 more days. 6 Tab 0    cyclobenzaprine (FLEXERIL) 10 mg tablet Take 1 Tab by mouth three (3) times daily as needed for Muscle Spasm(s).  30 Tab 2    ibuprofen (MOTRIN) 600 mg tablet Take 1 Tab by mouth every six (6) hours as needed for Pain. 100 Tab 0    cetirizine (ZYRTEC) 10 mg tablet Take 1 Tab by mouth daily as needed for Allergies. 90 Tab 2    lithium carbonate 300 mg tablet take 1 tablet by mouth every morning and 2 tablets at bedtime  0    risperiDONE (RISPERDAL) 1 mg tablet Take  by mouth daily.  buPROPion XL (WELLBUTRIN XL) 300 mg XL tablet Take 300 mg by mouth every morning. Allergies   Allergen Reactions    Adhesive Unknown (comments)     Plastic  Tape takes skin off    Pseudoephedrine Hcl Unknown (comments)     Very dry sinuses       Family History   Problem Relation Age of Onset    Bipolar Disorder Mother     Cancer Father     Diabetes Paternal Grandmother      Social History     Tobacco Use    Smoking status: Never Smoker    Smokeless tobacco: Never Used   Substance Use Topics    Alcohol use: No       Depression Risk Factor Screening:     3 most recent PHQ Screens 9/15/2020   PHQ Not Done -   Little interest or pleasure in doing things Several days   Feeling down, depressed, irritable, or hopeless Several days   Total Score PHQ 2 2       Alcohol Risk Screen   Do you average more than 1 drink per night or more than 7 drinks a week:  No    On any one occasion in the past three months have you have had more than 3 drinks containing alcohol:  No        Functional Ability and Level of Safety:   Hearing: Hearing is good. Activities of Daily Living: The home contains: no safety equipment. Patient does total self care     Ambulation: with no difficulty     Fall Risk:  Fall Risk Assessment, last 12 mths 9/15/2020   Able to walk? Yes   Fall in past 12 months?  No     Abuse Screen:  Patient is not abused       Cognitive Screening   Has your family/caregiver stated any concerns about your memory: no    Cognitive Screening: Normal - Verbal Fluency Test    Patient Care Team   Patient Care Team:  Carley Jackson MD as PCP - General (Internal Medicine)  Car[facility] Jackson [facility]  Daphnie Matos MD (Psychiatry)  Daphnie Matos MD (Psychiatry)    Assessment/Plan   Education and counseling provided:  Are appropriate based on today's review and evaluation  Screening Mammography  Screening Pap and pelvic (covered once every 2 years)     Declined mammogram and pap smear. She would like to wait next year. Diagnoses and all orders for this visit:    1. Bipolar 1 disorder ([facility]    2. Acute rhinosinusitis  -     azithromycin (ZITHROMAX) 250 mg tablet; 2 tabs today, then 1 tab for 4 more days. 3. Medicare annual wellness visit, subsequent    4. Screening for alcoholism        Health Maintenance Due   Topic Date Due    Shingrix Vaccine Age 50> (1 of 2) 08/07/2014    PAP AKA CERVICAL CYTOLOGY  03/28/2015    Breast Cancer Screen Mammogram  11/12/2016    Medicare Yearly Exam  04/16/2020    Flu Vaccine (1) 09/01/2020       Reyes Mcelroy, who was evaluated through a synchronous (real-time) audio-video encounter, and/or her healthcare decision maker, is aware that it is a billable service, with coverage as determined by her insurance carrier. She provided verbal consent to proceed: Yes, and patient identification was verified. It was conducted pursuant to the emergency declaration under the [facility], [address] [place] [address] authority and the [employer] and [employer] Act. A caregiver was present when appropriate. Ability to conduct physical exam was limited. I was in the office. The patient was at home.     Camila Merrill MD

## 2020-09-15 NOTE — PATIENT INSTRUCTIONS
Medicare Wellness Visit, Female The best way to live healthy is to have a lifestyle where you eat a well-balanced diet, exercise regularly, limit alcohol use, and quit all forms of tobacco/nicotine, if applicable. Regular preventive services are another way to keep healthy. Preventive services (vaccines, screening tests, monitoring & exams) can help personalize your care plan, which helps you manage your own care. Screening tests can find health problems at the earliest stages, when they are easiest to treat. [facility] follows the current, evidence-based guidelines published by the [facility] ([facility]) when recommending preventive services for our patients. Because we follow these guidelines, sometimes recommendations change over time as research supports it. (For example, mammograms used to be recommended annually. Even though Medicare will still pay for an annual mammogram, the newer guidelines recommend a mammogram every two years for women of average risk). Of course, you and your doctor may decide to screen more often for some diseases, based on your risk and your co-morbidities (chronic disease you are already diagnosed with). Preventive services for you include: - Medicare offers their members a free annual wellness visit, which is time for you and your primary care provider to discuss and plan for your preventive service needs. Take advantage of this benefit every year! 
-All adults over the age of 72 should receive the recommended pneumonia vaccines. Current USPSTF guidelines recommend a series of two vaccines for the best pneumonia protection.  
-All adults should have a flu vaccine yearly and a tetanus vaccine every 10 years.  
-All adults age 48 and older should receive the shingles vaccines (series of two vaccines). -All adults age 38-68 who are overweight should have a diabetes screening test once every three years. -All adults born between [birth year] and 1966 should be screened once for Hepatitis C. 
-Other screening tests and preventive services for persons with diabetes include: an eye exam to screen for diabetic retinopathy, a kidney function test, a foot exam, and stricter control over your cholesterol.  
-Cardiovascular screening for adults with routine risk involves an electrocardiogram (ECG) at intervals determined by your doctor.  
-Colorectal cancer screenings should be done for adults age 54-65 with no increased risk factors for colorectal cancer. There are a number of acceptable methods of screening for this type of cancer. Each test has its own benefits and drawbacks. Discuss with your doctor what is most appropriate for you during your annual wellness visit. The different tests include: colonoscopy (considered the best screening method), a fecal occult blood test, a fecal DNA test, and sigmoidoscopy. 
 
-A bone mass density test is recommended when a woman turns 65 to screen for osteoporosis. This test is only recommended one time, as a screening. Some providers will use this same test as a disease monitoring tool if you already have osteoporosis. -Breast cancer screenings are recommended every other year for women of normal risk, age 54-69. 
-Cervical cancer screenings for women over age 72 are only recommended with certain risk factors. Here is a list of your current Health Maintenance items (your personalized list of preventive services) with a due date: 
Health Maintenance Due Topic Date Due  Shingles Vaccine (1 of 2) 08/07/2014  Pap Test  03/28/2015  Mammogram  11/12/2016 [facility] Annual Well Visit  04/16/2020  Yearly Flu Vaccine (1) 09/01/2020

## 2021-01-14 ENCOUNTER — VIRTUAL VISIT (OUTPATIENT)
Dept: FAMILY MEDICINE CLINIC | Age: 56
End: 2021-01-14
Payer: MEDICARE

## 2021-01-14 DIAGNOSIS — J30.89 NON-SEASONAL ALLERGIC RHINITIS, UNSPECIFIED TRIGGER: ICD-10-CM

## 2021-01-14 DIAGNOSIS — J01.10 ACUTE NON-RECURRENT FRONTAL SINUSITIS: Primary | ICD-10-CM

## 2021-01-14 PROCEDURE — G8427 DOCREV CUR MEDS BY ELIG CLIN: HCPCS | Performed by: INTERNAL MEDICINE

## 2021-01-14 PROCEDURE — 3017F COLORECTAL CA SCREEN DOC REV: CPT | Performed by: INTERNAL MEDICINE

## 2021-01-14 PROCEDURE — 99213 OFFICE O/P EST LOW 20 MIN: CPT | Performed by: INTERNAL MEDICINE

## 2021-01-14 PROCEDURE — G9717 DOC PT DX DEP/BP F/U NT REQ: HCPCS | Performed by: INTERNAL MEDICINE

## 2021-01-14 PROCEDURE — G8421 BMI NOT CALCULATED: HCPCS | Performed by: INTERNAL MEDICINE

## 2021-01-14 RX ORDER — AMOXICILLIN AND CLAVULANATE POTASSIUM 875; 125 MG/1; MG/1
1 TABLET, FILM COATED ORAL EVERY 12 HOURS
Qty: 14 TAB | Refills: 0 | Status: SHIPPED | OUTPATIENT
Start: 2021-01-14 | End: 2021-01-21

## 2021-01-14 NOTE — PROGRESS NOTES
Paul Lester is a 63 y.o. female who was seen by synchronous (real-time) audio-video technology using doxy. me on 1/14/2021. Location of the patient: Home    Location of the provider: [facility]    Consent:  She and/or health care decision maker is aware that that she may receive a bill for this telehealth service, depending on her insurance coverage, and has provided verbal consent to proceed: Yes    Subjective:   Paul Lester is a 63 y.o. female who presents today for management of    Chief Complaint   Patient presents with    Sinus Pain       Sinus Pain  Patient complains of facial pain and nasal congestion. Symptoms include sinus pressure and rhinorrhea with no fever, chills, or night sweats. Onset of symptoms was 3 days ago, gradually worsening since that time. She is drinking plenty of fluids. .  Past history is significant for no history of pneumonia or bronchitis. Patient is non-smoker    Problem List  Patient Active Problem List    Diagnosis Date Noted    Glaucoma 07/09/2020    Lithium use 06/06/2018    Bipolar 1 disorder (HCC)     Chronic pain of right knee 10/17/2017    Chronic seasonal allergic rhinitis due to pollen 10/17/2017       Current Medications  Current Outpatient Medications   Medication Sig    amoxicillin-clavulanate (AUGMENTIN) 875-125 mg per tablet Take 1 Tab by mouth every twelve (12) hours for 7 days.  ibuprofen (MOTRIN) 600 mg tablet Take 1 Tab by mouth every six (6) hours as needed for Pain.  cyclobenzaprine (FLEXERIL) 10 mg tablet Take 1 Tab by mouth three (3) times daily as needed for Muscle Spasm(s).  cetirizine (ZYRTEC) 10 mg tablet Take 1 Tab by mouth daily as needed for Allergies.  lithium carbonate 300 mg tablet take 1 tablet by mouth every morning and 2 tablets at bedtime    risperiDONE (RISPERDAL) 1 mg tablet Take  by mouth daily.  buPROPion XL (WELLBUTRIN XL) 300 mg XL tablet Take 300 mg by mouth every morning.      No current facility-administered medications for this visit. Allergies/Drug Reactions  Allergies   Allergen Reactions    Adhesive Unknown (comments)     Plastic  Tape takes skin off    Pseudoephedrine Hcl Unknown (comments)     Very dry sinuses        Social History  Social History     Tobacco Use    Smoking status: Never Smoker    Smokeless tobacco: Never Used   Substance Use Topics    Alcohol use: No    Drug use: No        Review of Systems  Review of Systems   Constitutional: Negative for chills, fever, malaise/fatigue and weight loss. HENT: Positive for congestion and sinus pain. Negative for sore throat. Respiratory: Negative for cough, sputum production, shortness of breath and wheezing. Cardiovascular: Negative for chest pain, palpitations and leg swelling. Gastrointestinal: Negative for heartburn, nausea and vomiting. Neurological: Positive for headaches. Negative for dizziness. Psychiatric/Behavioral: Negative for depression and suicidal ideas. Objective:     General: alert, cooperative, no distress   Mental  status: mental status: alert, oriented to person, place, and time, normal mood, behavior, speech, dress, motor activity, and thought processes   Resp: resp: normal effort and no respiratory distress   Neuro: neuro: no gross deficits   Skin: skin: no discoloration or lesions of concern on visible areas     Due to this being a TeleHealth evaluation, many elements of the physical examination are unable to be assessed. Assessment & Plan:   1. Acute non-recurrent frontal sinusitis  - saline rinse  - amoxicillin-clavulanate (AUGMENTIN) 875-125 mg per tablet; Take 1 Tab by mouth every twelve (12) hours for 7 days. Dispense: 14 Tab; Refill: 0    2. Non-seasonal allergic rhinitis, unspecified trigger  - restart PO antihistamine      Follow-up and Dispositions    · Return if symptoms worsen or fail to improve.          We discussed the expected course, resolution and complications of the diagnosis(es) in detail. Medication risks, benefits, costs, interactions, and alternatives were discussed as indicated. I advised her to contact the office if her condition worsens, changes or fails to improve as anticipated. She expressed understanding with the diagnosis(es) and plan. Pursuant to the emergency declaration under the [address] [place] [address], [facility] waiver authority and the [email or web address] and [employer] Act, this Virtual  Visit was conducted, with patient's consent, to reduce the patient's risk of exposure to COVID-19 and provide continuity of care for an established patient. Services were provided through a video synchronous discussion virtually to substitute for in-person clinic visit.     Baldev Limon MD

## 2021-01-27 ENCOUNTER — VIRTUAL VISIT (OUTPATIENT)
Dept: FAMILY MEDICINE CLINIC | Age: 56
End: 2021-01-27
Payer: MEDICARE

## 2021-01-27 DIAGNOSIS — H40.9 GLAUCOMA OF BOTH EYES, UNSPECIFIED GLAUCOMA TYPE: ICD-10-CM

## 2021-01-27 DIAGNOSIS — H53.8 BLURRED VISION, BILATERAL: Primary | ICD-10-CM

## 2021-01-27 DIAGNOSIS — Z12.31 ENCOUNTER FOR SCREENING MAMMOGRAM FOR MALIGNANT NEOPLASM OF BREAST: ICD-10-CM

## 2021-01-27 DIAGNOSIS — I10 ESSENTIAL HYPERTENSION: ICD-10-CM

## 2021-01-27 PROCEDURE — 3017F COLORECTAL CA SCREEN DOC REV: CPT | Performed by: INTERNAL MEDICINE

## 2021-01-27 PROCEDURE — 99214 OFFICE O/P EST MOD 30 MIN: CPT | Performed by: INTERNAL MEDICINE

## 2021-01-27 PROCEDURE — G8756 NO BP MEASURE DOC: HCPCS | Performed by: INTERNAL MEDICINE

## 2021-01-27 PROCEDURE — G9899 SCRN MAM PERF RSLTS DOC: HCPCS | Performed by: INTERNAL MEDICINE

## 2021-01-27 PROCEDURE — G9717 DOC PT DX DEP/BP F/U NT REQ: HCPCS | Performed by: INTERNAL MEDICINE

## 2021-01-27 PROCEDURE — G8427 DOCREV CUR MEDS BY ELIG CLIN: HCPCS | Performed by: INTERNAL MEDICINE

## 2021-01-27 RX ORDER — FLUTICASONE PROPIONATE 50 MCG
SPRAY, SUSPENSION (ML) NASAL
COMMUNITY
Start: 2021-01-23 | End: 2021-03-02 | Stop reason: SDUPTHER

## 2021-01-27 RX ORDER — AMLODIPINE BESYLATE 2.5 MG/1
2.5 TABLET ORAL DAILY
Qty: 30 TAB | Refills: 0 | Status: SHIPPED | OUTPATIENT
Start: 2021-01-27 | End: 2021-03-02 | Stop reason: SDUPTHER

## 2021-01-27 NOTE — PROGRESS NOTES
Paul Lester is a 63 y.o. female who was seen by synchronous (real-time) audio-video technology using doxy. me on 1/27/2021. Location of the patient: Home    Location of the provider: [address] [place] [address]    Consent:  She and/or health care decision maker is aware that that she may receive a bill for this telehealth service, depending on her insurance coverage, and has provided verbal consent to proceed: Yes    Subjective:   Paul Lester is a 63 y.o. female who presents today for management of    Chief Complaint   Patient presents with    Eye Problem    Headache       Patient complains of blurred vision and mild headache. Onset was 2 weeks ago. She was seen by her ophthalmologist today. She was told that her glaucoma is stable. She was given eyedrops for left eye inflammation. Patient completed an antibiotic treatment for sinus infection 1 week ago. Patient is concerned about her blood pressure. Home blood pressure readings range from 130-150s. Problem List  Patient Active Problem List    Diagnosis Date Noted    Glaucoma 07/09/2020    Lithium use 06/06/2018    Bipolar 1 disorder (HCC)     Chronic pain of right knee 10/17/2017    Chronic seasonal allergic rhinitis due to pollen 10/17/2017       Current Medications  Current Outpatient Medications   Medication Sig    amLODIPine (NORVASC) 2.5 mg tablet Take 1 Tab by mouth daily.  fluticasone propionate (FLONASE) 50 mcg/actuation nasal spray instill 1 spray into each nostril once daily    ibuprofen (MOTRIN) 600 mg tablet Take 1 Tab by mouth every six (6) hours as needed for Pain.  cyclobenzaprine (FLEXERIL) 10 mg tablet Take 1 Tab by mouth three (3) times daily as needed for Muscle Spasm(s).  cetirizine (ZYRTEC) 10 mg tablet Take 1 Tab by mouth daily as needed for Allergies.  lithium carbonate 300 mg tablet take 1 tablet by mouth every morning and 2 tablets at bedtime    risperiDONE (RISPERDAL) 1 mg tablet Take  by mouth daily.     buPROPion XL (WELLBUTRIN XL) 300 mg XL tablet Take 300 mg by mouth every morning. No current facility-administered medications for this visit. Allergies/Drug Reactions  Allergies   Allergen Reactions    Adhesive Unknown (comments)     Plastic  Tape takes skin off    Pseudoephedrine Hcl Unknown (comments)     Very dry sinuses        Social History  Social History     Tobacco Use    Smoking status: Never Smoker    Smokeless tobacco: Never Used   Substance Use Topics    Alcohol use: No    Drug use: No        Review of Systems  Review of Systems   Constitutional: Negative for chills, fever, malaise/fatigue and weight loss. HENT: Negative. Eyes: Positive for blurred vision. Negative for double vision, photophobia and pain. Respiratory: Negative. Gastrointestinal: Negative. Musculoskeletal: Negative. Neurological: Positive for headaches. Negative for dizziness. Objective:     General: alert, cooperative, no distress   Mental  status: mental status: alert, oriented to person, place, and time, normal mood, behavior, speech, dress, motor activity, and thought processes   Resp: resp: normal effort and no respiratory distress   Neuro: neuro: no gross deficits   Skin: skin: no discoloration or lesions of concern on visible areas     Due to this being a TeleHealth evaluation, many elements of the physical examination are unable to be assessed. CT SINUSES WITHOUT CONTRAST 1/23/2021  FINDINGS:    The sinuses are well-aerated. No evidence of mucoperiosteal thickening or opacification. Cribriform plate region unremarkable. Remainder of imaged skull base unremarkable. Orbits are unremarkable. Lower intracranial imaging without concerning finding. Included facial soft tissues unremarkable. IMPRESSION    1. SINUSES CLEAR. Assessment & Plan:   1. Essential hypertension  - newly diagnosed  - start amLODIPine (NORVASC) 2.5 mg tablet; Take 1 Tab by mouth daily.   Dispense: 30 Tab; Refill: 0  - continue home blood pressure monitoring  - CBC WITH AUTOMATED DIFF; Future  - LIPID PANEL; Future  - METABOLIC PANEL, COMPREHENSIVE; Future  - TSH 3RD GENERATION; Future  - URINALYSIS W/ RFLX MICROSCOPIC; Future    2. Blurred vision, bilateral  - ophthalmology follow-up    3. Glaucoma of both eyes, unspecified glaucoma type  - stable    4. Encounter for screening mammogram for malignant neoplasm of breast  - [facility] MAMMO BI SCREENING INCL CAD; Future      Follow-up and Dispositions    · Return in about 1 month (around 2/27/2021) for doxy, BP check. We discussed the expected course, resolution and complications of the diagnosis(es) in detail. Medication risks, benefits, costs, interactions, and alternatives were discussed as indicated. I advised her to contact the office if her condition worsens, changes or fails to improve as anticipated. She expressed understanding with the diagnosis(es) and plan. Pursuant to the emergency declaration under the [facility], [facility] waiver authority and the [employer] Act, this Virtual  Visit was conducted, with patient's consent, to reduce the patient's risk of exposure to COVID-19 and provide continuity of care for an established patient. Services were provided through a video synchronous discussion virtually to substitute for in-person clinic visit.     Bang Burnett MD

## 2021-03-02 ENCOUNTER — VIRTUAL VISIT (OUTPATIENT)
Dept: FAMILY MEDICINE CLINIC | Age: 56
End: 2021-03-02
Payer: MEDICARE

## 2021-03-02 DIAGNOSIS — I10 ESSENTIAL HYPERTENSION: ICD-10-CM

## 2021-03-02 DIAGNOSIS — J30.1 CHRONIC SEASONAL ALLERGIC RHINITIS DUE TO POLLEN: Primary | ICD-10-CM

## 2021-03-02 DIAGNOSIS — H40.9 GLAUCOMA OF BOTH EYES, UNSPECIFIED GLAUCOMA TYPE: ICD-10-CM

## 2021-03-02 PROCEDURE — G9899 SCRN MAM PERF RSLTS DOC: HCPCS | Performed by: INTERNAL MEDICINE

## 2021-03-02 PROCEDURE — G8756 NO BP MEASURE DOC: HCPCS | Performed by: INTERNAL MEDICINE

## 2021-03-02 PROCEDURE — G8427 DOCREV CUR MEDS BY ELIG CLIN: HCPCS | Performed by: INTERNAL MEDICINE

## 2021-03-02 PROCEDURE — 3017F COLORECTAL CA SCREEN DOC REV: CPT | Performed by: INTERNAL MEDICINE

## 2021-03-02 PROCEDURE — G9717 DOC PT DX DEP/BP F/U NT REQ: HCPCS | Performed by: INTERNAL MEDICINE

## 2021-03-02 PROCEDURE — 99214 OFFICE O/P EST MOD 30 MIN: CPT | Performed by: INTERNAL MEDICINE

## 2021-03-02 RX ORDER — FLUTICASONE PROPIONATE 50 MCG
SPRAY, SUSPENSION (ML) NASAL
Qty: 1 BOTTLE | Refills: 2 | Status: SHIPPED | OUTPATIENT
Start: 2021-03-02 | End: 2021-03-16 | Stop reason: SDUPTHER

## 2021-03-02 RX ORDER — AMLODIPINE BESYLATE 2.5 MG/1
2.5 TABLET ORAL DAILY
Qty: 30 TAB | Refills: 0 | Status: SHIPPED | OUTPATIENT
Start: 2021-03-02 | End: 2021-03-16 | Stop reason: SDUPTHER

## 2021-03-02 NOTE — PROGRESS NOTES
Clifford Mercado is a 63 y.o. female who was seen by synchronous (real-time) audio-video technology using doxy. me on 3/2/2021. Location of the patient: Home    Location of the provider: [facility]    Consent:  She and/or health care decision maker is aware that that she may receive a bill for this telehealth service, depending on her insurance coverage, and has provided verbal consent to proceed: Yes    Subjective:   Clifford Mercado is a 63 y.o. female who presents today for management of    Chief Complaint   Patient presents with    Hypertension       Patient complains of pressure behind both eyes and postnasal drip. Hypertension  Patient is here for follow-up of hypertension. She was started on amlodipine one month ago. She indicates that she is feeling well and denies any symptoms referable to her hypertension. She is not exercising and is adherent to low salt diet. Blood pressure is well controlled at home. Use of agents associated with hypertension: none. Patient has not taken her meds for a few days due to not feeling well. Problem List  Patient Active Problem List    Diagnosis Date Noted    Glaucoma 07/09/2020    Lithium use 06/06/2018    Bipolar 1 disorder (HCC)     Chronic pain of right knee 10/17/2017    Chronic seasonal allergic rhinitis due to pollen 10/17/2017       Current Medications  Current Outpatient Medications   Medication Sig    fluticasone propionate (FLONASE) 50 mcg/actuation nasal spray instill 1 spray into each nostril once daily    amLODIPine (NORVASC) 2.5 mg tablet Take 1 Tab by mouth daily.  ibuprofen (MOTRIN) 600 mg tablet Take 1 Tab by mouth every six (6) hours as needed for Pain.  cyclobenzaprine (FLEXERIL) 10 mg tablet Take 1 Tab by mouth three (3) times daily as needed for Muscle Spasm(s).  cetirizine (ZYRTEC) 10 mg tablet Take 1 Tab by mouth daily as needed for Allergies.     lithium carbonate 300 mg tablet take 1 tablet by mouth every morning and 2 tablets at bedtime    risperiDONE (RISPERDAL) 1 mg tablet Take  by mouth daily.  buPROPion XL (WELLBUTRIN XL) 300 mg XL tablet Take 300 mg by mouth every morning. No current facility-administered medications for this visit. Allergies/Drug Reactions  Allergies   Allergen Reactions    Adhesive Unknown (comments)     Plastic  Tape takes skin off    Pseudoephedrine Hcl Unknown (comments)     Very dry sinuses        Social History  Social History     Tobacco Use    Smoking status: Never Smoker    Smokeless tobacco: Never Used   Substance Use Topics    Alcohol use: No    Drug use: No        Review of Systems  Review of Systems   Constitutional: Positive for malaise/fatigue. Negative for chills, fever and weight loss. Respiratory: Negative for cough, shortness of breath and wheezing. Cardiovascular: Negative for chest pain, palpitations and leg swelling. Gastrointestinal: Negative for abdominal pain, heartburn, nausea and vomiting. Neurological: Negative for dizziness and headaches. Objective:     General: alert, cooperative, no distress   Mental  status: mental status: alert, oriented to person, place, and time, normal mood, behavior, speech, dress, motor activity, and thought processes   Resp: resp: normal effort and no respiratory distress   Neuro: neuro: no gross deficits   Skin: skin: no discoloration or lesions of concern on visible areas     Due to this being a TeleHealth evaluation, many elements of the physical examination are unable to be assessed. Assessment & Plan:   1. Essential hypertension  - poorly controlled due to poor compliance with meds  - restart amlodipine  - continue home blood pressure monitoring  - avoid OTC decongestants    2. Chronic seasonal allergic rhinitis due to pollen  - restart Flonase  - OK to use coricidin  ray into each nostril once daily  Dispense: 1 Bottle; Refill: 2    3.  Glaucoma of both eyes, unspecified glaucoma type  - ophthalmology follow-up  - advised patient to avoid hypotension which can possibly cause decreased blood supply to the eyes. Follow-up and Dispositions    · Return in about 2 weeks (around 3/16/2021) for follow-up, doxy. I spent a total time of 31 minutes on the day of the visit. We discussed the expected course, resolution and complications of the diagnosis(es) in detail. Medication risks, benefits, costs, interactions, and alternatives were discussed as indicated. I advised her to contact the office if her condition worsens, changes or fails to improve as anticipated. She expressed understanding with the diagnosis(es) and plan. Pursuant to the emergency declaration under the [facility] waiver authority and the [employer] Act, this Virtual  Visit was conducted, with patient's consent, to reduce the patient's risk of exposure to COVID-19 and provide continuity of care for an established patient. Services were provided through a video synchronous discussion virtually to substitute for in-person clinic visit.     Marla Cameron MD

## 2021-03-16 ENCOUNTER — VIRTUAL VISIT (OUTPATIENT)
Dept: FAMILY MEDICINE CLINIC | Age: 56
End: 2021-03-16
Payer: MEDICARE

## 2021-03-16 DIAGNOSIS — F31.9 BIPOLAR 1 DISORDER (HCC): ICD-10-CM

## 2021-03-16 DIAGNOSIS — I10 ESSENTIAL HYPERTENSION: Primary | ICD-10-CM

## 2021-03-16 DIAGNOSIS — J30.1 CHRONIC SEASONAL ALLERGIC RHINITIS DUE TO POLLEN: ICD-10-CM

## 2021-03-16 PROCEDURE — G8427 DOCREV CUR MEDS BY ELIG CLIN: HCPCS | Performed by: INTERNAL MEDICINE

## 2021-03-16 PROCEDURE — 99214 OFFICE O/P EST MOD 30 MIN: CPT | Performed by: INTERNAL MEDICINE

## 2021-03-16 PROCEDURE — 3017F COLORECTAL CA SCREEN DOC REV: CPT | Performed by: INTERNAL MEDICINE

## 2021-03-16 PROCEDURE — G8756 NO BP MEASURE DOC: HCPCS | Performed by: INTERNAL MEDICINE

## 2021-03-16 PROCEDURE — G9717 DOC PT DX DEP/BP F/U NT REQ: HCPCS | Performed by: INTERNAL MEDICINE

## 2021-03-16 PROCEDURE — G8421 BMI NOT CALCULATED: HCPCS | Performed by: INTERNAL MEDICINE

## 2021-03-16 PROCEDURE — G9899 SCRN MAM PERF RSLTS DOC: HCPCS | Performed by: INTERNAL MEDICINE

## 2021-03-16 RX ORDER — AMLODIPINE BESYLATE 2.5 MG/1
2.5 TABLET ORAL DAILY
Qty: 90 TAB | Refills: 1 | Status: SHIPPED | OUTPATIENT
Start: 2021-03-16 | End: 2021-04-27 | Stop reason: SDUPTHER

## 2021-03-16 RX ORDER — FLUTICASONE PROPIONATE 50 MCG
SPRAY, SUSPENSION (ML) NASAL
Qty: 3 BOTTLE | Refills: 1 | Status: SHIPPED | OUTPATIENT
Start: 2021-03-16 | End: 2021-04-27 | Stop reason: SDUPTHER

## 2021-03-16 NOTE — PROGRESS NOTES
Steve Mcclure is a 63 y.o. female who was seen by synchronous (real-time) audio-video technology using doxy. me on 3/16/2021. Location of the patient: Home    Location of the provider: [facility]    Consent:  She and/or health care decision maker is aware that that she may receive a bill for this telehealth service, depending on her insurance coverage, and has provided verbal consent to proceed: Yes    Subjective:   Steve Mcclure is a 63 y.o. female who presents today for management of    Chief Complaint   Patient presents with    Hypertension       Hypertension  Patient is here for follow-up of hypertension. She indicates that she is feeling well and denies any symptoms referable to her hypertension. She is not exercising and is adherent to low salt diet. Blood pressure is well controlled at home. Use of agents associated with hypertension: none. Problem List  Patient Active Problem List    Diagnosis Date Noted    Glaucoma 07/09/2020    Lithium use 06/06/2018    Bipolar 1 disorder (HCC)     Chronic pain of right knee 10/17/2017    Chronic seasonal allergic rhinitis due to pollen 10/17/2017       Current Medications  Current Outpatient Medications   Medication Sig    fluticasone propionate (FLONASE) 50 mcg/actuation nasal spray instill 1 spray into each nostril once daily    amLODIPine (NORVASC) 2.5 mg tablet Take 1 Tab by mouth daily.  ibuprofen (MOTRIN) 600 mg tablet Take 1 Tab by mouth every six (6) hours as needed for Pain.  cyclobenzaprine (FLEXERIL) 10 mg tablet Take 1 Tab by mouth three (3) times daily as needed for Muscle Spasm(s).  cetirizine (ZYRTEC) 10 mg tablet Take 1 Tab by mouth daily as needed for Allergies.  lithium carbonate 300 mg tablet take 1 tablet by mouth every morning and 2 tablets at bedtime    risperiDONE (RISPERDAL) 1 mg tablet Take  by mouth daily.  buPROPion XL (WELLBUTRIN XL) 300 mg XL tablet Take 300 mg by mouth every morning.      No current facility-administered medications for this visit. Allergies/Drug Reactions  Allergies   Allergen Reactions    Adhesive Unknown (comments)     Plastic  Tape takes skin off    Pseudoephedrine Hcl Unknown (comments)     Very dry sinuses        Social History  Social History     Tobacco Use    Smoking status: Never Smoker    Smokeless tobacco: Never Used   Substance Use Topics    Alcohol use: No    Drug use: No        Review of Systems  Review of Systems   Constitutional: Negative for chills, fever, malaise/fatigue and weight loss. Respiratory: Negative for cough, sputum production and shortness of breath. Cardiovascular: Negative for chest pain, palpitations and leg swelling. Gastrointestinal: Negative for heartburn, nausea and vomiting. Neurological: Negative for dizziness and headaches. Objective:     General: alert, cooperative, no distress   Mental  status: mental status: alert, oriented to person, place, and time, normal mood, behavior, speech, dress, motor activity, and thought processes   Resp: resp: normal effort and no respiratory distress   Neuro: neuro: no gross deficits   Skin: skin: no discoloration or lesions of concern on visible areas     Due to this being a TeleHealth evaluation, many elements of the physical examination are unable to be assessed. Assessment & Plan:   1. Essential hypertension  - controlled  - continue amLODIPine (NORVASC) 2.5 mg tablet; Take 1 Tab by mouth daily. Dispense: 90 Tab; Refill: 1    2. Bipolar 1 disorder ([address]  - stable  - psychiatry follow-up    3. Chronic seasonal allergic rhinitis due to pollen  - improved  - continue fluticasone propionate (FLONASE) 50 mcg/actuation nasal spray; instill 1 spray into each nostril once daily  Dispense: 3 Bottle; Refill: 1      Follow-up and Dispositions    · Return in about 3 months (around 6/16/2021) for ROV, doxy.          We discussed the expected course, resolution and complications of the diagnosis(es) in detail. Medication risks, benefits, costs, interactions, and alternatives were discussed as indicated. I advised her to contact the office if her condition worsens, changes or fails to improve as anticipated. She expressed understanding with the diagnosis(es) and plan. Pursuant to the emergency declaration under the [facility], [facility] waiver authority and the [employer] Act, this Virtual  Visit was conducted, with patient's consent, to reduce the patient's risk of exposure to COVID-19 and provide continuity of care for an established patient. Services were provided through a video synchronous discussion virtually to substitute for in-person clinic visit.     Dolores Rincon MD

## 2021-04-27 ENCOUNTER — VIRTUAL VISIT (OUTPATIENT)
Dept: FAMILY MEDICINE CLINIC | Age: 56
End: 2021-04-27
Payer: MEDICARE

## 2021-04-27 DIAGNOSIS — I10 ESSENTIAL HYPERTENSION: ICD-10-CM

## 2021-04-27 DIAGNOSIS — M62.838 NECK MUSCLE SPASM: ICD-10-CM

## 2021-04-27 DIAGNOSIS — J30.1 CHRONIC SEASONAL ALLERGIC RHINITIS DUE TO POLLEN: ICD-10-CM

## 2021-04-27 DIAGNOSIS — J01.90 ACUTE NON-RECURRENT SINUSITIS, UNSPECIFIED LOCATION: Primary | ICD-10-CM

## 2021-04-27 PROCEDURE — G9899 SCRN MAM PERF RSLTS DOC: HCPCS | Performed by: INTERNAL MEDICINE

## 2021-04-27 PROCEDURE — G9717 DOC PT DX DEP/BP F/U NT REQ: HCPCS | Performed by: INTERNAL MEDICINE

## 2021-04-27 PROCEDURE — 3017F COLORECTAL CA SCREEN DOC REV: CPT | Performed by: INTERNAL MEDICINE

## 2021-04-27 PROCEDURE — G8756 NO BP MEASURE DOC: HCPCS | Performed by: INTERNAL MEDICINE

## 2021-04-27 PROCEDURE — G8427 DOCREV CUR MEDS BY ELIG CLIN: HCPCS | Performed by: INTERNAL MEDICINE

## 2021-04-27 PROCEDURE — G8421 BMI NOT CALCULATED: HCPCS | Performed by: INTERNAL MEDICINE

## 2021-04-27 PROCEDURE — 99214 OFFICE O/P EST MOD 30 MIN: CPT | Performed by: INTERNAL MEDICINE

## 2021-04-27 RX ORDER — FLUTICASONE PROPIONATE 50 MCG
SPRAY, SUSPENSION (ML) NASAL
Qty: 3 BOTTLE | Refills: 3 | Status: SHIPPED | OUTPATIENT
Start: 2021-04-27

## 2021-04-27 RX ORDER — AMLODIPINE BESYLATE 2.5 MG/1
2.5 TABLET ORAL DAILY
Qty: 90 TAB | Refills: 3 | Status: SHIPPED | OUTPATIENT
Start: 2021-04-27 | End: 2022-06-13 | Stop reason: SDUPTHER

## 2021-04-27 RX ORDER — DORZOLAMIDE HYDROCHLORIDE AND TIMOLOL MALEATE PRESERVATIVE FREE 20; 5 MG/ML; MG/ML
SOLUTION/ DROPS OPHTHALMIC
COMMUNITY
Start: 2021-03-20

## 2021-04-27 RX ORDER — AMOXICILLIN AND CLAVULANATE POTASSIUM 875; 125 MG/1; MG/1
1 TABLET, FILM COATED ORAL EVERY 12 HOURS
Qty: 20 TAB | Refills: 0 | Status: SHIPPED | OUTPATIENT
Start: 2021-04-27 | End: 2021-05-07

## 2021-04-27 RX ORDER — CYCLOBENZAPRINE HCL 10 MG
10 TABLET ORAL
Qty: 90 TAB | Refills: 2 | Status: SHIPPED | OUTPATIENT
Start: 2021-04-27

## 2021-04-27 RX ORDER — CETIRIZINE HCL 10 MG
10 TABLET ORAL
Qty: 90 TAB | Refills: 2 | Status: SHIPPED | OUTPATIENT
Start: 2021-04-27 | End: 2022-06-13 | Stop reason: SDUPTHER

## 2021-04-27 NOTE — PROGRESS NOTES
Barbara Epstein is a 63 y.o. female who was seen by synchronous (real-time) audio-video technology using doxy. me on 4/27/2021. Location of the patient: Home    Location of the provider: [facility]    Consent:  She and/or health care decision maker is aware that that she may receive a bill for this telehealth service, depending on her insurance coverage, and has provided verbal consent to proceed: Yes    Subjective:   Barbara Epstein is a 63 y.o. female who presents today for management of    Chief Complaint   Patient presents with    Sinus Infection       Patient complains of congestion, coryza and post nasal drip. Symptoms include facial pain and headache described as pressure with no fever, chills, or night sweats. Onset of symptoms was 4 days ago, unchanged since that time. She is drinking plenty of fluids. .  Past history is significant for no history of pneumonia or bronchitis and has history of seasonal allergic rhinitis. Patient is non-smoker    Problem List  Patient Active Problem List    Diagnosis Date Noted    Essential hypertension 04/27/2021    Glaucoma 07/09/2020    Lithium use 06/06/2018    Bipolar 1 disorder (HCC)     Chronic pain of right knee 10/17/2017    Chronic seasonal allergic rhinitis due to pollen 10/17/2017       Current Medications  Current Outpatient Medications   Medication Sig    cetirizine (ZYRTEC) 10 mg tablet Take 1 Tab by mouth daily as needed for Allergies.  amoxicillin-clavulanate (AUGMENTIN) 875-125 mg per tablet Take 1 Tab by mouth every twelve (12) hours for 10 days.  fluticasone propionate (FLONASE) 50 mcg/actuation nasal spray instill 1 spray into each nostril once daily    amLODIPine (NORVASC) 2.5 mg tablet Take 1 Tab by mouth daily.  cyclobenzaprine (FLEXERIL) 10 mg tablet Take 1 Tab by mouth three (3) times daily as needed for Muscle Spasm(s).     dorzolamide-timolol, PF, (COSOPT) 2-0.5 % ophthalmic solution PLACE 1 DROP INTO AFFECTED EYE(S) TWICE DAILY    ibuprofen (MOTRIN) 600 mg tablet Take 1 Tab by mouth every six (6) hours as needed for Pain.  lithium carbonate 300 mg tablet take 1 tablet by mouth every morning and 2 tablets at bedtime    risperiDONE (RISPERDAL) 1 mg tablet Take  by mouth daily.  buPROPion XL (WELLBUTRIN XL) 300 mg XL tablet Take 300 mg by mouth every morning. No current facility-administered medications for this visit. Allergies/Drug Reactions  Allergies   Allergen Reactions    Adhesive Unknown (comments)     Plastic  Tape takes skin off    Pseudoephedrine Hcl Unknown (comments)     Very dry sinuses        Social History  Social History     Tobacco Use    Smoking status: Never Smoker    Smokeless tobacco: Never Used   Substance Use Topics    Alcohol use: No    Drug use: No        Review of Systems  Review of Systems   Constitutional: Negative. HENT: Positive for congestion and sinus pain. Negative for ear discharge, ear pain, nosebleeds and sore throat. Respiratory: Negative. Negative for stridor. Cardiovascular: Negative. Gastrointestinal: Negative. Musculoskeletal: Negative. Neurological: Positive for dizziness and headaches. Psychiatric/Behavioral: Negative. Objective:     General: alert, cooperative, no distress   Mental  status: mental status: alert, oriented to person, place, and time, normal mood, behavior, speech, dress, motor activity, and thought processes   Resp: resp: normal effort and no respiratory distress   Neuro: neuro: no gross deficits   Skin: skin: no discoloration or lesions of concern on visible areas     Due to this being a TeleHealth evaluation, many elements of the physical examination are unable to be assessed.      Lab Results   Component Value Date/Time    WBC 6.7 06/06/2018 10:08 AM    HGB 13.2 06/06/2018 10:08 AM    HCT 41.4 06/06/2018 10:08 AM    PLATELET 780 80/69/9177 10:08 AM    MCV 90 06/06/2018 10:08 AM     Lab Results   Component Value Date/Time    TSH 3.160 06/06/2018 10:08 AM    T4, Free 0.8 07/05/2016 09:00 AM      Lab Results   Component Value Date/Time    Sodium 143 06/06/2018 10:08 AM    Potassium 4.3 06/06/2018 10:08 AM    Chloride 105 06/06/2018 10:08 AM    CO2 24 06/06/2018 10:08 AM    Anion gap 3 03/29/2017 11:13 AM    Glucose 100 (H) 06/06/2018 10:08 AM    BUN 12 06/06/2018 10:08 AM    Creatinine 0.90 06/06/2018 10:08 AM    BUN/Creatinine ratio 13 06/06/2018 10:08 AM    GFR est AA 84 06/06/2018 10:08 AM    GFR est non-AA 73 06/06/2018 10:08 AM    Calcium 9.7 06/06/2018 10:08 AM         Assessment & Plan:   1. Acute non-recurrent sinusitis, unspecified location  - saline rinse  - amoxicillin-clavulanate (AUGMENTIN) 875-125 mg per tablet; Take 1 Tab by mouth every twelve (12) hours for 10 days. Dispense: 20 Tab; Refill: 0    2. Chronic seasonal allergic rhinitis due to pollen  - cetirizine (ZYRTEC) 10 mg tablet; Take 1 Tab by mouth daily as needed for Allergies. Dispense: 90 Tab; Refill: 2  - fluticasone propionate (FLONASE) 50 mcg/actuation nasal spray; instill 1 spray into each nostril once daily  Dispense: 3 Bottle; Refill: 3    3. Essential hypertension  - controlled  - amLODIPine (NORVASC) 2.5 mg tablet; Take 1 Tab by mouth daily. Dispense: 90 Tab; Refill: 3    4. Neck muscle spasm  - refilled cyclobenzaprine (FLEXERIL) 10 mg tablet; Take 1 Tab by mouth three (3) times daily as needed for Muscle Spasm(s). Dispense: 90 Tab; Refill: 2      Follow-up and Dispositions    · Return in about 6 months (around 10/27/2021), or if symptoms worsen or fail to improve, for annual physical exam.         We discussed the expected course, resolution and complications of the diagnosis(es) in detail. Medication risks, benefits, costs, interactions, and alternatives were discussed as indicated. I advised her to contact the office if her condition worsens, changes or fails to improve as anticipated.  She expressed understanding with the diagnosis(es) and plan. Pursuant to the emergency declaration under the [facility] [address] waiver authority and the [employer] Act, this Virtual  Visit was conducted, with patient's consent, to reduce the patient's risk of exposure to COVID-19 and provide continuity of care for an established patient. Services were provided through a video synchronous discussion virtually to substitute for in-person clinic visit.     Julisa Levine MD

## 2021-12-08 ENCOUNTER — TELEPHONE (OUTPATIENT)
Dept: MAMMOGRAPHY | Age: 56
End: 2021-12-08

## 2022-03-18 PROBLEM — M25.561 CHRONIC PAIN OF RIGHT KNEE: Status: ACTIVE | Noted: 2017-10-17

## 2022-03-18 PROBLEM — J30.1 CHRONIC SEASONAL ALLERGIC RHINITIS DUE TO POLLEN: Status: ACTIVE | Noted: 2017-10-17

## 2022-03-18 PROBLEM — G89.29 CHRONIC PAIN OF RIGHT KNEE: Status: ACTIVE | Noted: 2017-10-17

## 2022-03-18 PROBLEM — I10 ESSENTIAL HYPERTENSION: Status: ACTIVE | Noted: 2021-04-27

## 2022-03-19 PROBLEM — Z79.899 LITHIUM USE: Status: ACTIVE | Noted: 2018-06-06

## 2022-03-19 PROBLEM — H40.9 GLAUCOMA: Status: ACTIVE | Noted: 2020-07-09

## 2022-06-13 DIAGNOSIS — J30.1 CHRONIC SEASONAL ALLERGIC RHINITIS DUE TO POLLEN: ICD-10-CM

## 2022-06-13 DIAGNOSIS — I10 ESSENTIAL HYPERTENSION: ICD-10-CM

## 2022-06-13 NOTE — TELEPHONE ENCOUNTER
----- Message from [address] sent at 6/13/2022  2:35 PM EDT -----  Subject: Refill Request    QUESTIONS  Name of Medication? cetirizine (ZYRTEC) 10 mg tablet  Patient-reported dosage and instructions? once a day  How many days do you have left? 1  Preferred Pharmacy? [address] [place] [address] [place] available[place] [phone]  ---------------------------------------------------------------------------  --------------,  Name of Medication? amLODIPine (NORVASC) 2.5 mg tablet  Patient-reported dosage and instructions? once day   How many days do you have left? 4  Preferred Pharmacy? [address] [place] [address],[place] [address] phone number (if available)? [phone]  ---------------------------------------------------------------------------  --------------  CALL BACK INFO  What is the best way for the office to contact you? OK to leave message on   voicemail  Preferred Call Back Phone Number? [phone]  ---------------------------------------------------------------------------  --------------  SCRIPT ANSWERS  Relationship to Patient?  Self

## 2022-06-14 RX ORDER — CETIRIZINE HCL 10 MG
10 TABLET ORAL
Qty: 90 TABLET | Refills: 0 | Status: SHIPPED | OUTPATIENT
Start: 2022-06-14

## 2022-06-14 RX ORDER — AMLODIPINE BESYLATE 2.5 MG/1
2.5 TABLET ORAL DAILY
Qty: 90 TABLET | Refills: 0 | Status: SHIPPED | OUTPATIENT
Start: 2022-06-14

## 2025-04-01 ENCOUNTER — TRANSCRIBE ORDERS (OUTPATIENT)
Facility: HOSPITAL | Age: 60
End: 2025-04-01

## 2025-04-01 DIAGNOSIS — Z12.31 ENCOUNTER FOR SCREENING MAMMOGRAM FOR MALIGNANT NEOPLASM OF BREAST: Primary | ICD-10-CM
